# Patient Record
Sex: FEMALE | Race: WHITE | Employment: PART TIME | URBAN - METROPOLITAN AREA
[De-identification: names, ages, dates, MRNs, and addresses within clinical notes are randomized per-mention and may not be internally consistent; named-entity substitution may affect disease eponyms.]

---

## 2017-08-23 PROBLEM — E55.9 VITAMIN D DEFICIENCY: Status: ACTIVE | Noted: 2017-08-23

## 2018-04-10 ENCOUNTER — HOSPITAL ENCOUNTER (OUTPATIENT)
Dept: PHYSICAL THERAPY | Age: 48
Discharge: HOME | End: 2018-04-10
Payer: OTHER GOVERNMENT

## 2018-04-10 ENCOUNTER — HOSPITAL ENCOUNTER (OUTPATIENT)
Dept: SURGERY | Age: 48
Discharge: HOME | End: 2018-04-10
Payer: OTHER GOVERNMENT

## 2018-04-10 VITALS
WEIGHT: 153 LBS | HEART RATE: 72 BPM | HEIGHT: 66 IN | TEMPERATURE: 97.2 F | SYSTOLIC BLOOD PRESSURE: 114 MMHG | BODY MASS INDEX: 24.59 KG/M2 | RESPIRATION RATE: 18 BRPM | DIASTOLIC BLOOD PRESSURE: 67 MMHG | OXYGEN SATURATION: 98 %

## 2018-04-10 DIAGNOSIS — R06.83 SNORING: Primary | ICD-10-CM

## 2018-04-10 LAB
ANION GAP SERPL CALC-SCNC: 8 MMOL/L (ref 7–16)
APPEARANCE UR: CLEAR
APTT PPP: 27.8 SEC (ref 23.2–35.3)
ATRIAL RATE: 65 BPM
BACTERIA SPEC CULT: ABNORMAL
BASOPHILS # BLD: 0 K/UL (ref 0–0.2)
BASOPHILS NFR BLD: 1 % (ref 0–2)
BILIRUB UR QL: NEGATIVE
BUN SERPL-MCNC: 18 MG/DL (ref 6–23)
CALCIUM SERPL-MCNC: 9.2 MG/DL (ref 8.3–10.4)
CALCULATED P AXIS, ECG09: 53 DEGREES
CALCULATED R AXIS, ECG10: 12 DEGREES
CALCULATED T AXIS, ECG11: 32 DEGREES
CHLORIDE SERPL-SCNC: 103 MMOL/L (ref 98–107)
CO2 SERPL-SCNC: 29 MMOL/L (ref 21–32)
COLOR UR: YELLOW
CREAT SERPL-MCNC: 0.74 MG/DL (ref 0.6–1)
DIAGNOSIS, 93000: NORMAL
DIFFERENTIAL METHOD BLD: NORMAL
EOSINOPHIL # BLD: 0.2 K/UL (ref 0–0.8)
EOSINOPHIL NFR BLD: 3 % (ref 0.5–7.8)
ERYTHROCYTE [DISTWIDTH] IN BLOOD BY AUTOMATED COUNT: 14.2 % (ref 11.9–14.6)
GLUCOSE SERPL-MCNC: 82 MG/DL (ref 65–100)
GLUCOSE UR STRIP.AUTO-MCNC: NEGATIVE MG/DL
HCT VFR BLD AUTO: 44.8 % (ref 35.8–46.3)
HGB BLD-MCNC: 14.3 G/DL (ref 11.7–15.4)
HGB UR QL STRIP: NEGATIVE
IMM GRANULOCYTES # BLD: 0 K/UL (ref 0–0.5)
IMM GRANULOCYTES NFR BLD AUTO: 0 % (ref 0–5)
INR PPP: 1
KETONES UR QL STRIP.AUTO: NEGATIVE MG/DL
LEUKOCYTE ESTERASE UR QL STRIP.AUTO: NEGATIVE
LYMPHOCYTES # BLD: 1.2 K/UL (ref 0.5–4.6)
LYMPHOCYTES NFR BLD: 15 % (ref 13–44)
MCH RBC QN AUTO: 28.7 PG (ref 26.1–32.9)
MCHC RBC AUTO-ENTMCNC: 31.9 G/DL (ref 31.4–35)
MCV RBC AUTO: 89.8 FL (ref 79.6–97.8)
MONOCYTES # BLD: 0.8 K/UL (ref 0.1–1.3)
MONOCYTES NFR BLD: 10 % (ref 4–12)
NEUTS SEG # BLD: 5.8 K/UL (ref 1.7–8.2)
NEUTS SEG NFR BLD: 71 % (ref 43–78)
NITRITE UR QL STRIP.AUTO: NEGATIVE
P-R INTERVAL, ECG05: 158 MS
PH UR STRIP: 6 [PH] (ref 5–9)
PLATELET # BLD AUTO: 235 K/UL (ref 150–450)
PMV BLD AUTO: 12 FL (ref 10.8–14.1)
POTASSIUM SERPL-SCNC: 4.4 MMOL/L (ref 3.5–5.1)
PROT UR STRIP-MCNC: NEGATIVE MG/DL
PROTHROMBIN TIME: 13.4 SEC (ref 11.5–14.5)
Q-T INTERVAL, ECG07: 380 MS
QRS DURATION, ECG06: 84 MS
QTC CALCULATION (BEZET), ECG08: 395 MS
RBC # BLD AUTO: 4.99 M/UL (ref 4.05–5.25)
SERVICE CMNT-IMP: ABNORMAL
SODIUM SERPL-SCNC: 140 MMOL/L (ref 136–145)
SP GR UR REFRACTOMETRY: 1 (ref 1–1.02)
UROBILINOGEN UR QL STRIP.AUTO: 0.2 EU/DL (ref 0.2–1)
VENTRICULAR RATE, ECG03: 65 BPM
WBC # BLD AUTO: 8.1 K/UL (ref 4.3–11.1)

## 2018-04-10 PROCEDURE — 93005 ELECTROCARDIOGRAM TRACING: CPT | Performed by: ANESTHESIOLOGY

## 2018-04-10 PROCEDURE — 85610 PROTHROMBIN TIME: CPT | Performed by: PHYSICIAN ASSISTANT

## 2018-04-10 PROCEDURE — 87641 MR-STAPH DNA AMP PROBE: CPT | Performed by: PHYSICIAN ASSISTANT

## 2018-04-10 PROCEDURE — 81003 URINALYSIS AUTO W/O SCOPE: CPT | Performed by: PHYSICIAN ASSISTANT

## 2018-04-10 PROCEDURE — 36415 COLL VENOUS BLD VENIPUNCTURE: CPT | Performed by: PHYSICIAN ASSISTANT

## 2018-04-10 PROCEDURE — 85730 THROMBOPLASTIN TIME PARTIAL: CPT | Performed by: PHYSICIAN ASSISTANT

## 2018-04-10 PROCEDURE — 97161 PT EVAL LOW COMPLEX 20 MIN: CPT

## 2018-04-10 PROCEDURE — 80048 BASIC METABOLIC PNL TOTAL CA: CPT | Performed by: PHYSICIAN ASSISTANT

## 2018-04-10 PROCEDURE — 85025 COMPLETE CBC W/AUTO DIFF WBC: CPT | Performed by: PHYSICIAN ASSISTANT

## 2018-04-10 PROCEDURE — 77030027138 HC INCENT SPIROMETER -A

## 2018-04-10 RX ORDER — ACETAMINOPHEN 325 MG/1
325 TABLET ORAL
COMMUNITY
End: 2018-12-13

## 2018-04-10 RX ORDER — IBUPROFEN 200 MG
200 TABLET ORAL
COMMUNITY
End: 2018-04-27

## 2018-04-10 RX ORDER — VALACYCLOVIR HYDROCHLORIDE 1 G/1
TABLET, FILM COATED ORAL DAILY
COMMUNITY
End: 2021-07-20
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
## 2018-04-10 NOTE — PROGRESS NOTES
04/10/18 0730   Oxygen Therapy   O2 Sat (%) 100 %   Pulse via Oximetry 73 beats per minute   O2 Device Room air   Pre-Treatment   Breath Sounds Bilateral Clear   Pre FEV1 (liters) 2.4 liters   % Predicted 82   Incentive Spirometry Treatment   Actual Volume (ml) 2000 ml   Sleep Disorder Breathing Screen:     Patient reports symptoms of:   · Snoring   · Excessive daytime sleepiness with napping  · FATIGUE  · SLEEPS ON ,BACK, SIDE AND STOMACH  · STOP-BANG __2__  · [place] Score __9__  · Height__5'5\"___ [id number]_338 lbs____  · LEONG 4   HX OF HEADACHES  PT'S FATHER HAS DIAGNOSED ROSANNA. PT HAS BEEN CONCERNED SHE HAS ROSANNA AND HAS BEEN WANTING A SLEEP STUDY FOR QUITE SOME TIME. Sleepiness Scale:     Sitting and reading 2    Watching TV 0    Sitting inactive in a public place 1    As a passenger in a car for an hour without a break 1    Lying down to rest in the afternoon when circumstances Permits 3    Sitting and talking to someone 0    Sitting quietly after lunch without alcohol 2    In a car, while stopped for a few minutes 0    Total :  9    Refer patient for sleep study based on above assessment. Initial respiratory Assessment completed with pt. Pt was interviewed and evaluated in Joint camp prior to surgery. Patient ID:  Samara Hernandez  [id number]  40 y.o.  1970  Surgeon: Dr. Abdon Brown  Date of Surgery: The linked surgery was not found. Please check manually. Procedure: Total Left Knee Arthroplasty  Primary Care Physician: Merlin Holder, [phone]  Specialists:                                  Pt instructed in the use of Incentive Spirometry. Pt instructed to bring Incentive Spirometer back on date of surgery & to start using Is upon return to pt room.     Pt taught proper cough technique    History of smoking:   NONE                                                           Quit date:            Secondhand smoke:FATHER      Past procedures with Oxygen desaturation:NONE    Past Medical History: Diagnosis Date    Eczema     Herpes simplex     Valtrex     OA (osteoarthritis) of knee     Osteopenia     from chronic steroid treatment    Ulcerative colitis, chronic (HCC)     s/p colectomy with J pouch    Vitamin D deficiency                                     ENT:SINUS                                                                                                                      Respiratory history:DENIES SOB                                                                  Respiratory meds:  NA                                       FAMILY PRESENT:                                                            NO                                        PAST SLEEP STUDY:                           NO  HX OF ROSANNA:                                          NO                                   FATHER HAS ROSANNA  ROSANNA assessment:     PT HAS BEEN WANTING A SLEEP STUDY FOR QUITE SOME TIME                                          SLEEPS ON SIDE     ,        BACK       &         STOMACH                                                 PHYSICAL EXAM   Body mass index is 24.69 kg/(m^2).    Visit Vitals    /67 (BP 1 Location: Left arm, BP Patient Position: At rest;Sitting)    Pulse 72    Temp 97.2 °F (36.2 °C)    Resp 18    Ht 5' 6\" (1.676 m)    Wt 69.4 kg (153 lb)    SpO2 98%    BMI 24.69 kg/m2     Neck circumference:  35    cm    Loud snoring:        YES                                Witnessed apnea or wakening gasping or choking:,             DENIES,                                                                                                 Awakens with headaches:                                                  DENIES    Morning or daytime tiredness/ sleepiness:                                                                                                         TIRED   Dry mouth or sore throat in morning:                YES Sheets stage:  4    SACS score:6    STOP/BAN                              CPAP:                       NONE                                                  CONT SAT HS              Referrals:  HST  Pt.  Phone Number:    [phone]

## 2018-04-10 NOTE — PERIOP NOTES
Labs dated 4/10/18 routed via [address] [facility] to patients PCP, Dr. Jason Rojo, per Dr. Ronny uDron' request.

## 2018-04-10 NOTE — PERIOP NOTES
Recent Results (from the past 12 hour(s))   CBC WITH AUTOMATED DIFF    Collection Time: 04/10/18  7:40 AM   Result Value Ref Range    WBC 8.1 4.3 - 11.1 K/uL    RBC 4.99 4.05 - 5.25 M/uL    HGB 14.3 11.7 - 15.4 g/dL    HCT 44.8 35.8 - 46.3 %    MCV 89.8 79.6 - 97.8 FL    MCH 28.7 26.1 - 32.9 PG    MCHC 31.9 31.4 - 35.0 g/dL    RDW 14.2 11.9 - 14.6 %    PLATELET 901 437 - 765 K/uL    MPV 12.0 10.8 - 14.1 FL    DF AUTOMATED      NEUTROPHILS 71 43 - 78 %    LYMPHOCYTES 15 13 - 44 %    MONOCYTES 10 4.0 - 12.0 %    EOSINOPHILS 3 0.5 - 7.8 %    BASOPHILS 1 0.0 - 2.0 %    IMMATURE GRANULOCYTES 0 0.0 - 5.0 %    ABS. NEUTROPHILS 5.8 1.7 - 8.2 K/UL    ABS. LYMPHOCYTES 1.2 0.5 - 4.6 K/UL    ABS. MONOCYTES 0.8 0.1 - 1.3 K/UL    ABS. EOSINOPHILS 0.2 0.0 - 0.8 K/UL    ABS. BASOPHILS 0.0 0.0 - 0.2 K/UL    ABS. IMM.  GRANS. 0.0 0.0 - 0.5 K/UL   PROTHROMBIN TIME + INR    Collection Time: 04/10/18  7:40 AM   Result Value Ref Range    Prothrombin time 13.4 11.5 - 14.5 sec    INR 1.0     PTT    Collection Time: 04/10/18  7:40 AM   Result Value Ref Range    aPTT 27.8 23.2 - 43.6 SEC   METABOLIC PANEL, BASIC    Collection Time: 04/10/18  7:40 AM   Result Value Ref Range    Sodium 140 136 - 145 mmol/L    Potassium 4.4 3.5 - 5.1 mmol/L    Chloride 103 98 - 107 mmol/L    CO2 29 21 - 32 mmol/L    Anion gap 8 7 - 16 mmol/L    Glucose 82 65 - 100 mg/dL    BUN 18 6 - 23 MG/DL    Creatinine 0.74 0.6 - 1.0 MG/DL    GFR est AA >60 >60 ml/min/1.73m2    GFR est non-AA >60 >60 ml/min/1.73m2    Calcium 9.2 8.3 - 10.4 MG/DL   URINALYSIS W/ RFLX MICROSCOPIC    Collection Time: 04/10/18  7:40 AM   Result Value Ref Range    Color YELLOW      Appearance CLEAR      Specific gravity 1.005 1.001 - 1.023      pH (UA) 6.0 5.0 - 9.0      Protein NEGATIVE  NEG mg/dL    Glucose NEGATIVE  mg/dL    Ketone NEGATIVE  NEG mg/dL    Bilirubin NEGATIVE  NEG      Blood NEGATIVE  NEG      Urobilinogen 0.2 0.2 - 1.0 EU/dL    Nitrites NEGATIVE  NEG      Leukocyte Esterase NEGATIVE  NEG     EKG, 12 LEAD, INITIAL    Collection Time: 04/10/18  8:05 AM   Result Value Ref Range    Ventricular Rate 65 BPM    Atrial Rate 65 BPM    P-R Interval 158 ms    QRS Duration 84 ms    Q-T Interval 380 ms    QTC Calculation (Bezet) 395 ms    Calculated P Axis 53 degrees    Calculated R Axis 12 degrees    Calculated T Axis 32 degrees    Diagnosis       Normal sinus rhythm  Possible Left atrial enlargement  Septal infarct , age undetermined  Abnormal ECG  No previous ECGs available

## 2018-04-10 NOTE — PERIOP NOTES
Patient verified name, , and surgery as listed in [facility]. Type 3 surgery, PAT joint assessment complete. Labs per surgeon: cbc, bmp, ua, pt/inr, ptt, mrsa/mssa swab, T&S DOS; results within anesthesia limits. Labs per anesthesia protocol: All required lab work included in surgeon's orders. EKG: completed 4/10/18; results to be reviewed by anesthesia. No previous EKG available. Patient c/o discomfort and burning with urination that began 18. Patient seen at Urgent Care and prescribed Diflucan. Patient reports Diflucan did not resolve her discomfort. Patient suspects she may be having a herpes outbreak. Patient has an  prescription for 1 Gram of Valtrex. UA results and WBC results WNL today (4/10/18). MICKY Rae made aware of the above. Robi Mayes gave patient prescription for 1 Gram Valtrex daily x 7 days and instructed patient to make an appointment to see her gyn immediately. Patient verbalized understanding. Charge nurse to follow up. Hibiclens and instructions to return bottle on DOS given per hospital policy. Nasal Swab collected per MD order and instructions for Mupirocin nasal ointment if required. Patient provided with handouts including Guide to Surgery, Pain Management, Hand Hygiene, Blood Transfusion Education, and [facility] Brochure. Patient answered medical/surgical history questions at their best of ability. All prior to admission medications documented in [facility]. Original medication prescription bottle NOT visualized during patient appointment. Patient instructed to hold all vitamins 7 days prior to surgery and NSAIDS 5 days prior to surgery. Medications to be held: all vitamins/supplements/herbals and Ibuprofen.      Patient instructed to continue previous medications as prescribed prior to surgery and to take the following medications the day of surgery according to anesthesia guidelines with a small sip of water: Xanax if needed, Tylenol if needed, and Valtrex. Patient instructed to bring bottle of Hibiclens and incentive spirometer to the hospital on the DOS. Patient teach back successful and patient demonstrates knowledge of instruction.

## 2018-04-11 NOTE — PERIOP NOTES
Prescription for Mupirocin ointment 22g tube, apply intranasally to both nostrils BID x5 days pre-operatively or for a total of 10 doses; called to [facility] at [phone]. Message left for pt to return call to [phone] for swab results and instructions. 4/10/2018 12:28 PM - [place], Lab In [employer]   Component Results   Component Value Flag Ref Range Units Status   Special Requests: NO SPECIAL REQUESTS     Final   Culture result:  (A)    Final   MRSA target DNA detected, SA target DNA detected.       A positive test result does not necessarily indicate the presence of viable organisms.  It is however, presumptive for the presence of MRSA or SA.
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
## 2018-04-24 ENCOUNTER — ANESTHESIA EVENT (OUTPATIENT)
Dept: SURGERY | Age: 48
DRG: 470 | End: 2018-04-24
Payer: OTHER GOVERNMENT

## 2018-04-25 ENCOUNTER — HOME HEALTH ADMISSION (OUTPATIENT)
Dept: HOME HEALTH SERVICES | Facility: HOME HEALTH | Age: 48
End: 2018-04-25
Payer: OTHER GOVERNMENT

## 2018-04-25 ENCOUNTER — HOSPITAL ENCOUNTER (INPATIENT)
Age: 48
LOS: 2 days | Discharge: HOME HEALTH | DRG: 470 | End: 2018-04-27
Attending: ORTHOPAEDIC SURGERY | Admitting: ORTHOPAEDIC SURGERY
Payer: OTHER GOVERNMENT

## 2018-04-25 ENCOUNTER — ANESTHESIA (OUTPATIENT)
Dept: SURGERY | Age: 48
DRG: 470 | End: 2018-04-25
Payer: OTHER GOVERNMENT

## 2018-04-25 DIAGNOSIS — Z96.652 STATUS POST TOTAL LEFT KNEE REPLACEMENT: Primary | ICD-10-CM

## 2018-04-25 DIAGNOSIS — M17.32 POST-TRAUMATIC OSTEOARTHRITIS OF LEFT KNEE: ICD-10-CM

## 2018-04-25 LAB
ABO + RH BLD: NORMAL
BLOOD GROUP ANTIBODIES SERPL: NORMAL
GLUCOSE BLD STRIP.AUTO-MCNC: 91 MG/DL (ref 65–100)
HCG UR QL: NEGATIVE
HGB BLD-MCNC: 11.6 G/DL (ref 11.7–15.4)
SPECIMEN EXP DATE BLD: NORMAL

## 2018-04-25 PROCEDURE — 76010010054 HC POST OP PAIN BLOCK: Performed by: ORTHOPAEDIC SURGERY

## 2018-04-25 PROCEDURE — 77030037364 HC TIB INST CR  DISP STRY -C: Performed by: ORTHOPAEDIC SURGERY

## 2018-04-25 PROCEDURE — 74011250636 HC RX REV CODE- 250/636

## 2018-04-25 PROCEDURE — 77030002966 HC SUT PDS J&J -A: Performed by: ORTHOPAEDIC SURGERY

## 2018-04-25 PROCEDURE — 77030007880 HC KT SPN EPDRL BBMI -B: Performed by: ANESTHESIOLOGY

## 2018-04-25 PROCEDURE — 74011000302 HC RX REV CODE- 302: Performed by: ORTHOPAEDIC SURGERY

## 2018-04-25 PROCEDURE — 99253 IP/OBS CNSLTJ NEW/EST LOW 45: CPT | Performed by: PHYSICAL MEDICINE & REHABILITATION

## 2018-04-25 PROCEDURE — 77030011640 HC PAD GRND REM COVD -A: Performed by: ORTHOPAEDIC SURGERY

## 2018-04-25 PROCEDURE — 77030008467 HC STPLR SKN COVD -B: Performed by: ORTHOPAEDIC SURGERY

## 2018-04-25 PROCEDURE — 77030037363 HC FEM INST CR  DISP STRY -C: Performed by: ORTHOPAEDIC SURGERY

## 2018-04-25 PROCEDURE — 74011250637 HC RX REV CODE- 250/637: Performed by: INTERNAL MEDICINE

## 2018-04-25 PROCEDURE — 77030018836 HC SOL IRR NACL ICUM -A: Performed by: ORTHOPAEDIC SURGERY

## 2018-04-25 PROCEDURE — 77030011208: Performed by: ORTHOPAEDIC SURGERY

## 2018-04-25 PROCEDURE — 77030003665 HC NDL SPN BBMI -A: Performed by: ANESTHESIOLOGY

## 2018-04-25 PROCEDURE — 74011000250 HC RX REV CODE- 250: Performed by: ANESTHESIOLOGY

## 2018-04-25 PROCEDURE — 77030036688 HC BLNKT CLD THER S2SG -B

## 2018-04-25 PROCEDURE — 77030032490 HC SLV COMPR SCD KNE COVD -B

## 2018-04-25 PROCEDURE — 74011250637 HC RX REV CODE- 250/637: Performed by: ANESTHESIOLOGY

## 2018-04-25 PROCEDURE — 74011250637 HC RX REV CODE- 250/637: Performed by: PHYSICIAN ASSISTANT

## 2018-04-25 PROCEDURE — 65270000029 HC RM PRIVATE

## 2018-04-25 PROCEDURE — 77030006789 HC BLD SAW OSC STRY -C: Performed by: ORTHOPAEDIC SURGERY

## 2018-04-25 PROCEDURE — 74011250636 HC RX REV CODE- 250/636: Performed by: ANESTHESIOLOGY

## 2018-04-25 PROCEDURE — 77030002912 HC SUT ETHBND J&J -A: Performed by: ORTHOPAEDIC SURGERY

## 2018-04-25 PROCEDURE — 74011000258 HC RX REV CODE- 258: Performed by: ORTHOPAEDIC SURGERY

## 2018-04-25 PROCEDURE — 82962 GLUCOSE BLOOD TEST: CPT

## 2018-04-25 PROCEDURE — 86900 BLOOD TYPING SEROLOGIC ABO: CPT | Performed by: PHYSICIAN ASSISTANT

## 2018-04-25 PROCEDURE — 74011250636 HC RX REV CODE- 250/636: Performed by: ORTHOPAEDIC SURGERY

## 2018-04-25 PROCEDURE — 77030035236 HC SUT PDS STRATFX BARB J&J -B: Performed by: ORTHOPAEDIC SURGERY

## 2018-04-25 PROCEDURE — 77030006720 HC BLD PAT RMR ZIMM -B: Performed by: ORTHOPAEDIC SURGERY

## 2018-04-25 PROCEDURE — C1776 JOINT DEVICE (IMPLANTABLE): HCPCS | Performed by: ORTHOPAEDIC SURGERY

## 2018-04-25 PROCEDURE — 0SRD0JA REPLACEMENT OF LEFT KNEE JOINT WITH SYNTHETIC SUBSTITUTE, UNCEMENTED, OPEN APPROACH: ICD-10-PCS | Performed by: ORTHOPAEDIC SURGERY

## 2018-04-25 PROCEDURE — 77030013727 HC IRR FAN PULSVC ZIMM -B: Performed by: ORTHOPAEDIC SURGERY

## 2018-04-25 PROCEDURE — 36415 COLL VENOUS BLD VENIPUNCTURE: CPT | Performed by: PHYSICIAN ASSISTANT

## 2018-04-25 PROCEDURE — 86580 TB INTRADERMAL TEST: CPT | Performed by: ORTHOPAEDIC SURGERY

## 2018-04-25 PROCEDURE — 87075 CULTR BACTERIA EXCEPT BLOOD: CPT | Performed by: ORTHOPAEDIC SURGERY

## 2018-04-25 PROCEDURE — 97165 OT EVAL LOW COMPLEX 30 MIN: CPT

## 2018-04-25 PROCEDURE — 77030003602 HC NDL NRV BLK BBMI -B: Performed by: ANESTHESIOLOGY

## 2018-04-25 PROCEDURE — 77030012935 HC DRSG AQUACEL BMS -B: Performed by: ORTHOPAEDIC SURGERY

## 2018-04-25 PROCEDURE — 81025 URINE PREGNANCY TEST: CPT

## 2018-04-25 PROCEDURE — 94762 N-INVAS EAR/PLS OXIMTRY CONT: CPT

## 2018-04-25 PROCEDURE — 77030020268 HC MISC GENERAL SUPPLY: Performed by: ORTHOPAEDIC SURGERY

## 2018-04-25 PROCEDURE — 74011250636 HC RX REV CODE- 250/636: Performed by: PHYSICIAN ASSISTANT

## 2018-04-25 PROCEDURE — 76060000035 HC ANESTHESIA 2 TO 2.5 HR: Performed by: ORTHOPAEDIC SURGERY

## 2018-04-25 PROCEDURE — 85018 HEMOGLOBIN: CPT | Performed by: PHYSICIAN ASSISTANT

## 2018-04-25 PROCEDURE — 97161 PT EVAL LOW COMPLEX 20 MIN: CPT

## 2018-04-25 PROCEDURE — 76942 ECHO GUIDE FOR BIOPSY: CPT | Performed by: ORTHOPAEDIC SURGERY

## 2018-04-25 PROCEDURE — 87205 SMEAR GRAM STAIN: CPT | Performed by: ORTHOPAEDIC SURGERY

## 2018-04-25 PROCEDURE — 76010000171 HC OR TIME 2 TO 2.5 HR INTENSV-TIER 1: Performed by: ORTHOPAEDIC SURGERY

## 2018-04-25 PROCEDURE — 76210000016 HC OR PH I REC 1 TO 1.5 HR: Performed by: ORTHOPAEDIC SURGERY

## 2018-04-25 PROCEDURE — 77030031139 HC SUT VCRL2 J&J -A: Performed by: ORTHOPAEDIC SURGERY

## 2018-04-25 PROCEDURE — 74011000250 HC RX REV CODE- 250

## 2018-04-25 PROCEDURE — 77030034849: Performed by: ORTHOPAEDIC SURGERY

## 2018-04-25 PROCEDURE — 74011000250 HC RX REV CODE- 250: Performed by: ORTHOPAEDIC SURGERY

## 2018-04-25 PROCEDURE — 77030020782 HC GWN BAIR PAWS FLX 3M -B: Performed by: ANESTHESIOLOGY

## 2018-04-25 DEVICE — TIBIAL COMPONENT
Type: IMPLANTABLE DEVICE | Site: KNEE | Status: FUNCTIONAL
Brand: TRIATHLON

## 2018-04-25 DEVICE — TIBIAL BEARING INSERT
Type: IMPLANTABLE DEVICE | Site: KNEE | Status: FUNCTIONAL
Brand: TRIATHLON

## 2018-04-25 DEVICE — COMPONENT PAT DIA31MM THK9MM KNEE TRITANIUM MTL BK: Type: IMPLANTABLE DEVICE | Site: KNEE | Status: FUNCTIONAL

## 2018-04-25 DEVICE — POSTERIOR STABILIZED FEMORAL
Type: IMPLANTABLE DEVICE | Site: KNEE | Status: FUNCTIONAL
Brand: TRIATHLON

## 2018-04-25 RX ORDER — CELECOXIB 200 MG/1
200 CAPSULE ORAL ONCE
Status: COMPLETED | OUTPATIENT
Start: 2018-04-25 | End: 2018-04-25

## 2018-04-25 RX ORDER — SODIUM CHLORIDE 0.9 % (FLUSH) 0.9 %
5-10 SYRINGE (ML) INJECTION EVERY 8 HOURS
Status: DISCONTINUED | OUTPATIENT
Start: 2018-04-25 | End: 2018-04-27 | Stop reason: HOSPADM

## 2018-04-25 RX ORDER — CEFAZOLIN SODIUM/WATER 2 G/20 ML
2 SYRINGE (ML) INTRAVENOUS EVERY 8 HOURS
Status: COMPLETED | OUTPATIENT
Start: 2018-04-25 | End: 2018-04-26

## 2018-04-25 RX ORDER — LIDOCAINE HYDROCHLORIDE 10 MG/ML
0.1 INJECTION INFILTRATION; PERINEURAL AS NEEDED
Status: DISCONTINUED | OUTPATIENT
Start: 2018-04-25 | End: 2018-04-25 | Stop reason: HOSPADM

## 2018-04-25 RX ORDER — ACETAMINOPHEN 10 MG/ML
1000 INJECTION, SOLUTION INTRAVENOUS ONCE
Status: COMPLETED | OUTPATIENT
Start: 2018-04-25 | End: 2018-04-25

## 2018-04-25 RX ORDER — TRANEXAMIC ACID 100 MG/ML
INJECTION, SOLUTION INTRAVENOUS AS NEEDED
Status: DISCONTINUED | OUTPATIENT
Start: 2018-04-25 | End: 2018-04-25 | Stop reason: HOSPADM

## 2018-04-25 RX ORDER — ASPIRIN 81 MG/1
81 TABLET ORAL EVERY 12 HOURS
Qty: 70 TAB | Refills: 0 | Status: SHIPPED | OUTPATIENT
Start: 2018-04-25 | End: 2018-05-30

## 2018-04-25 RX ORDER — HYDROMORPHONE HYDROCHLORIDE 2 MG/1
2 TABLET ORAL
Status: DISCONTINUED | OUTPATIENT
Start: 2018-04-25 | End: 2018-04-27 | Stop reason: HOSPADM

## 2018-04-25 RX ORDER — DIPHENHYDRAMINE HCL 25 MG
25 CAPSULE ORAL
Status: DISCONTINUED | OUTPATIENT
Start: 2018-04-25 | End: 2018-04-27 | Stop reason: HOSPADM

## 2018-04-25 RX ORDER — CEFAZOLIN SODIUM/WATER 2 G/20 ML
2 SYRINGE (ML) INTRAVENOUS ONCE
Status: COMPLETED | OUTPATIENT
Start: 2018-04-25 | End: 2018-04-25

## 2018-04-25 RX ORDER — TRISODIUM CITRATE DIHYDRATE AND CITRIC ACID MONOHYDRATE 500; 334 MG/5ML; MG/5ML
30 SOLUTION ORAL
Status: COMPLETED | OUTPATIENT
Start: 2018-04-25 | End: 2018-04-25

## 2018-04-25 RX ORDER — HYDROMORPHONE HYDROCHLORIDE 2 MG/1
2 TABLET ORAL
Qty: 40 TAB | Refills: 0 | Status: SHIPPED | OUTPATIENT
Start: 2018-04-25 | End: 2018-08-07

## 2018-04-25 RX ORDER — NALOXONE HYDROCHLORIDE 0.4 MG/ML
.2-.4 INJECTION, SOLUTION INTRAMUSCULAR; INTRAVENOUS; SUBCUTANEOUS
Status: DISCONTINUED | OUTPATIENT
Start: 2018-04-25 | End: 2018-04-27 | Stop reason: HOSPADM

## 2018-04-25 RX ORDER — FLUCONAZOLE 100 MG/1
150 TABLET ORAL DAILY
Status: DISCONTINUED | OUTPATIENT
Start: 2018-04-26 | End: 2018-04-25

## 2018-04-25 RX ORDER — KETOROLAC TROMETHAMINE 30 MG/ML
INJECTION, SOLUTION INTRAMUSCULAR; INTRAVENOUS AS NEEDED
Status: DISCONTINUED | OUTPATIENT
Start: 2018-04-25 | End: 2018-04-25 | Stop reason: HOSPADM

## 2018-04-25 RX ORDER — ALPRAZOLAM 0.5 MG/1
0.25 TABLET ORAL
Status: DISCONTINUED | OUTPATIENT
Start: 2018-04-25 | End: 2018-04-27 | Stop reason: HOSPADM

## 2018-04-25 RX ORDER — SODIUM CHLORIDE 9 MG/ML
100 INJECTION, SOLUTION INTRAVENOUS CONTINUOUS
Status: DISPENSED | OUTPATIENT
Start: 2018-04-25 | End: 2018-04-27

## 2018-04-25 RX ORDER — FLUCONAZOLE 100 MG/1
200 TABLET ORAL
Status: COMPLETED | OUTPATIENT
Start: 2018-04-26 | End: 2018-04-26

## 2018-04-25 RX ORDER — ONDANSETRON 2 MG/ML
INJECTION INTRAMUSCULAR; INTRAVENOUS AS NEEDED
Status: DISCONTINUED | OUTPATIENT
Start: 2018-04-25 | End: 2018-04-25 | Stop reason: HOSPADM

## 2018-04-25 RX ORDER — PROPOFOL 10 MG/ML
INJECTION, EMULSION INTRAVENOUS
Status: DISCONTINUED | OUTPATIENT
Start: 2018-04-25 | End: 2018-04-25 | Stop reason: HOSPADM

## 2018-04-25 RX ORDER — SODIUM CHLORIDE 0.9 % (FLUSH) 0.9 %
5-10 SYRINGE (ML) INJECTION AS NEEDED
Status: DISCONTINUED | OUTPATIENT
Start: 2018-04-25 | End: 2018-04-27 | Stop reason: HOSPADM

## 2018-04-25 RX ORDER — ACETAMINOPHEN 500 MG
1000 TABLET ORAL ONCE
Status: COMPLETED | OUTPATIENT
Start: 2018-04-25 | End: 2018-04-25

## 2018-04-25 RX ORDER — MIDAZOLAM HYDROCHLORIDE 1 MG/ML
2 INJECTION, SOLUTION INTRAMUSCULAR; INTRAVENOUS ONCE
Status: COMPLETED | OUTPATIENT
Start: 2018-04-25 | End: 2018-04-25

## 2018-04-25 RX ORDER — HYDROMORPHONE HYDROCHLORIDE 2 MG/ML
1 INJECTION, SOLUTION INTRAMUSCULAR; INTRAVENOUS; SUBCUTANEOUS
Status: DISCONTINUED | OUTPATIENT
Start: 2018-04-25 | End: 2018-04-27 | Stop reason: HOSPADM

## 2018-04-25 RX ORDER — SODIUM CHLORIDE 0.9 % (FLUSH) 0.9 %
5-10 SYRINGE (ML) INJECTION EVERY 8 HOURS
Status: DISCONTINUED | OUTPATIENT
Start: 2018-04-25 | End: 2018-04-25 | Stop reason: HOSPADM

## 2018-04-25 RX ORDER — OXYCODONE HYDROCHLORIDE 5 MG/1
5 TABLET ORAL
Status: DISCONTINUED | OUTPATIENT
Start: 2018-04-25 | End: 2018-04-25 | Stop reason: HOSPADM

## 2018-04-25 RX ORDER — HYDROMORPHONE HYDROCHLORIDE 2 MG/ML
0.5 INJECTION, SOLUTION INTRAMUSCULAR; INTRAVENOUS; SUBCUTANEOUS
Status: DISCONTINUED | OUTPATIENT
Start: 2018-04-25 | End: 2018-04-25 | Stop reason: HOSPADM

## 2018-04-25 RX ORDER — CELECOXIB 200 MG/1
200 CAPSULE ORAL EVERY 12 HOURS
Status: DISCONTINUED | OUTPATIENT
Start: 2018-04-25 | End: 2018-04-27 | Stop reason: HOSPADM

## 2018-04-25 RX ORDER — ROPIVACAINE HYDROCHLORIDE 5 MG/ML
INJECTION, SOLUTION EPIDURAL; INFILTRATION; PERINEURAL AS NEEDED
Status: DISCONTINUED | OUTPATIENT
Start: 2018-04-25 | End: 2018-04-25 | Stop reason: HOSPADM

## 2018-04-25 RX ORDER — SODIUM CHLORIDE 0.9 % (FLUSH) 0.9 %
5-10 SYRINGE (ML) INJECTION AS NEEDED
Status: DISCONTINUED | OUTPATIENT
Start: 2018-04-25 | End: 2018-04-25 | Stop reason: HOSPADM

## 2018-04-25 RX ORDER — HYDROMORPHONE HYDROCHLORIDE 2 MG/1
2 TABLET ORAL
Status: DISCONTINUED | OUTPATIENT
Start: 2018-04-25 | End: 2018-04-25

## 2018-04-25 RX ORDER — SODIUM CHLORIDE, SODIUM LACTATE, POTASSIUM CHLORIDE, CALCIUM CHLORIDE 600; 310; 30; 20 MG/100ML; MG/100ML; MG/100ML; MG/100ML
100 INJECTION, SOLUTION INTRAVENOUS CONTINUOUS
Status: DISCONTINUED | OUTPATIENT
Start: 2018-04-25 | End: 2018-04-25 | Stop reason: HOSPADM

## 2018-04-25 RX ORDER — FENTANYL CITRATE 50 UG/ML
100 INJECTION, SOLUTION INTRAMUSCULAR; INTRAVENOUS ONCE
Status: COMPLETED | OUTPATIENT
Start: 2018-04-25 | End: 2018-04-25

## 2018-04-25 RX ORDER — SODIUM CHLORIDE, SODIUM LACTATE, POTASSIUM CHLORIDE, CALCIUM CHLORIDE 600; 310; 30; 20 MG/100ML; MG/100ML; MG/100ML; MG/100ML
75 INJECTION, SOLUTION INTRAVENOUS CONTINUOUS
Status: DISCONTINUED | OUTPATIENT
Start: 2018-04-25 | End: 2018-04-25 | Stop reason: HOSPADM

## 2018-04-25 RX ORDER — DEXAMETHASONE SODIUM PHOSPHATE 4 MG/ML
INJECTION, SOLUTION INTRA-ARTICULAR; INTRALESIONAL; INTRAMUSCULAR; INTRAVENOUS; SOFT TISSUE AS NEEDED
Status: DISCONTINUED | OUTPATIENT
Start: 2018-04-25 | End: 2018-04-25 | Stop reason: HOSPADM

## 2018-04-25 RX ORDER — NALOXONE HYDROCHLORIDE 0.4 MG/ML
0.2 INJECTION, SOLUTION INTRAMUSCULAR; INTRAVENOUS; SUBCUTANEOUS AS NEEDED
Status: DISCONTINUED | OUTPATIENT
Start: 2018-04-25 | End: 2018-04-25 | Stop reason: HOSPADM

## 2018-04-25 RX ORDER — FENTANYL CITRATE 50 UG/ML
INJECTION, SOLUTION INTRAMUSCULAR; INTRAVENOUS AS NEEDED
Status: DISCONTINUED | OUTPATIENT
Start: 2018-04-25 | End: 2018-04-25 | Stop reason: HOSPADM

## 2018-04-25 RX ORDER — MIDAZOLAM HYDROCHLORIDE 1 MG/ML
INJECTION, SOLUTION INTRAMUSCULAR; INTRAVENOUS AS NEEDED
Status: DISCONTINUED | OUTPATIENT
Start: 2018-04-25 | End: 2018-04-25 | Stop reason: HOSPADM

## 2018-04-25 RX ORDER — ASPIRIN 81 MG/1
81 TABLET ORAL EVERY 12 HOURS
Status: DISCONTINUED | OUTPATIENT
Start: 2018-04-25 | End: 2018-04-27 | Stop reason: HOSPADM

## 2018-04-25 RX ORDER — FENTANYL CITRATE 50 UG/ML
100 INJECTION, SOLUTION INTRAMUSCULAR; INTRAVENOUS ONCE
Status: DISCONTINUED | OUTPATIENT
Start: 2018-04-25 | End: 2018-04-25 | Stop reason: HOSPADM

## 2018-04-25 RX ORDER — AMOXICILLIN 250 MG
2 CAPSULE ORAL DAILY
Status: DISCONTINUED | OUTPATIENT
Start: 2018-04-26 | End: 2018-04-25

## 2018-04-25 RX ORDER — VALACYCLOVIR HYDROCHLORIDE 500 MG/1
1000 TABLET, FILM COATED ORAL DAILY
Status: DISCONTINUED | OUTPATIENT
Start: 2018-04-26 | End: 2018-04-25

## 2018-04-25 RX ORDER — LOPERAMIDE HYDROCHLORIDE 2 MG/1
2 CAPSULE ORAL
Status: DISCONTINUED | OUTPATIENT
Start: 2018-04-25 | End: 2018-04-27 | Stop reason: HOSPADM

## 2018-04-25 RX ORDER — PROPOFOL 10 MG/ML
INJECTION, EMULSION INTRAVENOUS AS NEEDED
Status: DISCONTINUED | OUTPATIENT
Start: 2018-04-25 | End: 2018-04-25 | Stop reason: HOSPADM

## 2018-04-25 RX ORDER — ACETAMINOPHEN 500 MG
1000 TABLET ORAL EVERY 6 HOURS
Status: DISCONTINUED | OUTPATIENT
Start: 2018-04-26 | End: 2018-04-27 | Stop reason: HOSPADM

## 2018-04-25 RX ORDER — DEXAMETHASONE SODIUM PHOSPHATE 100 MG/10ML
10 INJECTION INTRAMUSCULAR; INTRAVENOUS ONCE
Status: ACTIVE | OUTPATIENT
Start: 2018-04-26 | End: 2018-04-27

## 2018-04-25 RX ORDER — AMOXICILLIN 250 MG
2 CAPSULE ORAL
Status: DISCONTINUED | OUTPATIENT
Start: 2018-04-25 | End: 2018-04-27 | Stop reason: HOSPADM

## 2018-04-25 RX ORDER — ONDANSETRON 2 MG/ML
4 INJECTION INTRAMUSCULAR; INTRAVENOUS
Status: DISCONTINUED | OUTPATIENT
Start: 2018-04-25 | End: 2018-04-27 | Stop reason: HOSPADM

## 2018-04-25 RX ADMIN — PROPOFOL 30 MG: 10 INJECTION, EMULSION INTRAVENOUS at 11:22

## 2018-04-25 RX ADMIN — ACETAMINOPHEN 1000 MG: 500 TABLET, FILM COATED ORAL at 08:03

## 2018-04-25 RX ADMIN — VANCOMYCIN HYDROCHLORIDE 1 G: 1 INJECTION, POWDER, LYOPHILIZED, FOR SOLUTION INTRAVENOUS at 10:15

## 2018-04-25 RX ADMIN — FENTANYL CITRATE 25 MCG: 50 INJECTION, SOLUTION INTRAMUSCULAR; INTRAVENOUS at 11:01

## 2018-04-25 RX ADMIN — MIDAZOLAM HYDROCHLORIDE 1 MG: 1 INJECTION, SOLUTION INTRAMUSCULAR; INTRAVENOUS at 10:04

## 2018-04-25 RX ADMIN — ALPRAZOLAM 0.25 MG: 0.5 TABLET ORAL at 23:28

## 2018-04-25 RX ADMIN — FENTANYL CITRATE 12.5 MCG: 50 INJECTION, SOLUTION INTRAMUSCULAR; INTRAVENOUS at 10:44

## 2018-04-25 RX ADMIN — PROPOFOL 100 MCG/KG/MIN: 10 INJECTION, EMULSION INTRAVENOUS at 10:04

## 2018-04-25 RX ADMIN — FENTANYL CITRATE 25 MCG: 50 INJECTION, SOLUTION INTRAMUSCULAR; INTRAVENOUS at 10:34

## 2018-04-25 RX ADMIN — ACETAMINOPHEN 1000 MG: 10 INJECTION, SOLUTION INTRAVENOUS at 17:35

## 2018-04-25 RX ADMIN — SODIUM CHLORIDE, SODIUM LACTATE, POTASSIUM CHLORIDE, AND CALCIUM CHLORIDE 100 ML/HR: 600; 310; 30; 20 INJECTION, SOLUTION INTRAVENOUS at 08:02

## 2018-04-25 RX ADMIN — LOPERAMIDE HYDROCHLORIDE 2 MG: 2 CAPSULE ORAL at 14:55

## 2018-04-25 RX ADMIN — FENTANYL CITRATE 12.5 MCG: 50 INJECTION, SOLUTION INTRAMUSCULAR; INTRAVENOUS at 10:57

## 2018-04-25 RX ADMIN — Medication 2 G: at 17:35

## 2018-04-25 RX ADMIN — MIDAZOLAM HYDROCHLORIDE 2 MG: 1 INJECTION, SOLUTION INTRAMUSCULAR; INTRAVENOUS at 09:47

## 2018-04-25 RX ADMIN — ASPIRIN 81 MG: 81 TABLET, COATED ORAL at 20:25

## 2018-04-25 RX ADMIN — HYDROMORPHONE HYDROCHLORIDE 1 MG: 2 INJECTION, SOLUTION INTRAMUSCULAR; INTRAVENOUS; SUBCUTANEOUS at 16:11

## 2018-04-25 RX ADMIN — FENTANYL CITRATE 12.5 MCG: 50 INJECTION, SOLUTION INTRAMUSCULAR; INTRAVENOUS at 10:58

## 2018-04-25 RX ADMIN — CELECOXIB 200 MG: 200 CAPSULE ORAL at 08:03

## 2018-04-25 RX ADMIN — Medication 2 G: at 09:50

## 2018-04-25 RX ADMIN — DEXAMETHASONE SODIUM PHOSPHATE 10 MG: 4 INJECTION, SOLUTION INTRA-ARTICULAR; INTRALESIONAL; INTRAMUSCULAR; INTRAVENOUS; SOFT TISSUE at 10:10

## 2018-04-25 RX ADMIN — TUBERCULIN PURIFIED PROTEIN DERIVATIVE 5 UNITS: 5 INJECTION, SOLUTION INTRADERMAL at 08:05

## 2018-04-25 RX ADMIN — SODIUM CHLORIDE, SODIUM LACTATE, POTASSIUM CHLORIDE, AND CALCIUM CHLORIDE: 600; 310; 30; 20 INJECTION, SOLUTION INTRAVENOUS at 09:36

## 2018-04-25 RX ADMIN — SODIUM CITRATE AND CITRIC ACID MONOHYDRATE 30 ML: 500; 334 SOLUTION ORAL at 12:22

## 2018-04-25 RX ADMIN — MIDAZOLAM HYDROCHLORIDE 1 MG: 1 INJECTION, SOLUTION INTRAMUSCULAR; INTRAVENOUS at 10:09

## 2018-04-25 RX ADMIN — ACETAMINOPHEN 1000 MG: 500 TABLET, FILM COATED ORAL at 23:26

## 2018-04-25 RX ADMIN — SODIUM CHLORIDE, SODIUM LACTATE, POTASSIUM CHLORIDE, AND CALCIUM CHLORIDE: 600; 310; 30; 20 INJECTION, SOLUTION INTRAVENOUS at 10:52

## 2018-04-25 RX ADMIN — CELECOXIB 200 MG: 200 CAPSULE ORAL at 20:25

## 2018-04-25 RX ADMIN — SODIUM CHLORIDE 100 ML/HR: 900 INJECTION, SOLUTION INTRAVENOUS at 14:00

## 2018-04-25 RX ADMIN — ROPIVACAINE HYDROCHLORIDE 20 ML: 5 INJECTION, SOLUTION EPIDURAL; INFILTRATION; PERINEURAL at 09:07

## 2018-04-25 RX ADMIN — VANCOMYCIN HYDROCHLORIDE 1000 MG: 1 INJECTION, POWDER, LYOPHILIZED, FOR SOLUTION INTRAVENOUS at 08:36

## 2018-04-25 RX ADMIN — HYDROMORPHONE HYDROCHLORIDE 1 MG: 2 INJECTION, SOLUTION INTRAMUSCULAR; INTRAVENOUS; SUBCUTANEOUS at 23:27

## 2018-04-25 RX ADMIN — PROPOFOL 20 MG: 10 INJECTION, EMULSION INTRAVENOUS at 11:26

## 2018-04-25 RX ADMIN — PROPOFOL 20 MG: 10 INJECTION, EMULSION INTRAVENOUS at 11:16

## 2018-04-25 RX ADMIN — Medication 5 ML: at 14:00

## 2018-04-25 RX ADMIN — FENTANYL CITRATE 12.5 MCG: 50 INJECTION, SOLUTION INTRAMUSCULAR; INTRAVENOUS at 10:52

## 2018-04-25 RX ADMIN — TRANEXAMIC ACID 1 G: 100 INJECTION, SOLUTION INTRAVENOUS at 09:56

## 2018-04-25 RX ADMIN — ONDANSETRON 4 MG: 2 INJECTION INTRAMUSCULAR; INTRAVENOUS at 10:10

## 2018-04-25 RX ADMIN — PROPOFOL 20 MG: 10 INJECTION, EMULSION INTRAVENOUS at 11:18

## 2018-04-25 RX ADMIN — HYDROMORPHONE HYDROCHLORIDE 2 MG: 2 TABLET ORAL at 15:17

## 2018-04-25 RX ADMIN — PROPOFOL 30 MG: 10 INJECTION, EMULSION INTRAVENOUS at 10:53

## 2018-04-25 RX ADMIN — LIDOCAINE HYDROCHLORIDE 0.1 ML: 10 INJECTION, SOLUTION INFILTRATION; PERINEURAL at 08:05

## 2018-04-25 RX ADMIN — MIDAZOLAM HYDROCHLORIDE 2 MG: 1 INJECTION, SOLUTION INTRAMUSCULAR; INTRAVENOUS at 09:05

## 2018-04-25 RX ADMIN — FENTANYL CITRATE 100 MCG: 50 INJECTION, SOLUTION INTRAMUSCULAR; INTRAVENOUS at 09:05

## 2018-04-25 RX ADMIN — HYDROMORPHONE HYDROCHLORIDE 1 MG: 2 INJECTION, SOLUTION INTRAMUSCULAR; INTRAVENOUS; SUBCUTANEOUS at 20:25

## 2018-04-25 NOTE — PERIOP NOTES
Betadine lavage:  17.5cc of betadine lot # [id number] , exp. Date : 09/19 ,  in 500cc of . 9NS Lot #[id number]  , exp. Date : 1feb2021.

## 2018-04-25 NOTE — OP NOTES
[address] [facility]  Total Knee Arthroplasty  Ronnie Lynch   : 1970  Medical Record Number:[id number]  Pre-operative Diagnosis:  Primary osteoarthritis of left knee [M17.12]  Post-operative Diagnosis: Status post total left knee replacement  Location: [address] [place] [address]  Surgeon: Jaleel Quigley MD  Assistant: Kemar Amaya PA-C    Anesthesia: Spinal and FNB    Procedure: Procedure(s) (LRB):  KNEE ARTHROPLASTY TOTAL/ LEFT/ DYANA/ FNB /ANCEF 2gm / Jordonst Barker ([place] - 2018) (Left)    The complexity of the total joint surgery requires the use of a first assistant for positioning, retraction and expertise in closure. Tourniquet Time: 0 minutes  EBL: 250cc  Findings: severe degenerative arthritis, patellar osteophytes, posterior femoral osteophytes   BMI: Body mass index is 24.69 kg/(m^2). Elian Ashford was brought to the operating room and positioned on the operating table. She was anethestized with anesthesia. A pryor catheter was placed preoperatively and IV antibiotics was administered. Prior to the incision being made a timeout was called identifying the patient, procedure ,operative side and surgeon. .The operative leg was prepped and draped in the usual sterile manner. An anterior longitudinal incision was accomplished just medial to the tibial tubercle and extending approximal 6 centimeters proximal to the superior pole of the patella. A medial parapatellar capsular incision was performed. The medial capsular flap was elevated around to the insertion of the semimembranous tendon. The patella was everted and the knee flexed and externally rotated. The medial and external menisci were excised. The lateral half of the fat pad excised and the patella femoral ligament was released. The anterior cruciate ligament was resected and the posterior cruciate ligament was substituted.   Using extramedullary instrumentation, the tibial cut was accomplished with appropriate posterior slope. Approxiamately 9mm of bone was removed from the high side of the tibia. The distal femur was next addressed. A drill hole was made above the intracondolar notch. Using appropriate intramedullary instrumentation,a five degree valgus distal cut was accomplished. The femur was sized. The anterior and posterior cuts were then made about the distal femur. The osteophytes were removed from the tibial and femoral surfaces. The flexion and extension gaps were assessed with the appropriate spacer blocks. Additional surgical procedures included: none. The flexion and extension gaps were deemed appropriately balanced. The appropriate cutting blocks were then utilized to perform the anterior chamfer, posterior chamfer and notch cuts, with appropriate lateral tranlation accomplished for the patellofemoral groove. The tibia baseplate was sized. The tibial base plate was pinned into place with the appropriate external rotation and stem site prepared. A preliminary range of motion was accomplished with  trial components. The patient was able to obtain full extension as well as appropriate flexion. The patient's ligaments were stable in flexion and extension to medial and lateral stressing and the alignment was through the appropriate mechanical axis. The patella was then everted. The bone was resected to accomodate the three peg  patella button. A trial reduction revealed appropriate tracking through the patellofemoral groove with no lateral retinacular release being accomplished. All trial components were removed. The knee was irrigated. There were no femoral deficiencies. There were no tibial deficiencies. No augmentation was utilized. The permanent Tibial and Femoral components were impacted into place. The patella component was then pressed in place.     [facility] knee was placed through range of motion and noted to be stable as mentioned above with the trail components. The wound was dry, therefore no drain was used. The operative knee was injected with 60cc of Naropin, 10 cc's of morphine and 1 cc of 30mg of Toradol. The knee was then soaked with a diluted betadine solution for approximately 3 min. This was then thoroughly irrigated. The capsular layer was closed using a #1 PDS suture. The knee joint was then injected with transexamic acid. The subcutaneous layers were closed using 2-0 Stratafix suture. Finally the skin was closed using 3-0 Vicryl and skin staples, which were applied in occlusive fashion and sterile bandage applied. An Iceman cryo pad was applied on the operative leg. Sponge count and needle counts were correct. [employer] left the operating room     Implants:   Implant Name Type Inv.  Item Serial No.  Lot No. LRB No. Used   COMPNT FEM PS TRIATHLN 3 L PA --  - [id number]  COMPNT FEM PS TRIATHLN 3 L PA --  [id number] [employer] HOW [id number] Left 1   BASEPLT TIB PC TRITNM SZ 3 -- TRIATHLON - [id number]  BASEPLT TIB PC TRITNM SZ 3 -- TRIATHLON [id number] [employer] HOW [id number] Left 1   PAT SYMM MTL-BK 9MM SZ 31MM -- TRIATHLON - [id number]  PAT SYMM MTL-BK 9MM SZ 31MM -- TRIATHLON [id number] [employer] HOW [id number] Left 1   INSERT TIB PS TRIATHLN 3 11MM --  - [id number]   INSERT TIB PS TRIATHLN 3 11MM --  [id number] [employer] HOW [id number] Left 1           Signed By: Sarah Simeon MD  4/25/2018,  11:54 AM
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
## 2018-04-25 NOTE — ANESTHESIA POSTPROCEDURE EVALUATION
Post-Anesthesia Evaluation and Assessment    Patient: Shant Villegas MRN: [id number]  SSN: [id number]    YOB: 1970  Age: 50 y.o. Sex: female       Cardiovascular Function/Vital Signs  Visit Vitals    /57 (BP 1 Location: Right arm, BP Patient Position: At rest)    Pulse 84    Temp 36.6 °C (97.9 °F)    Resp 18    Ht 5' 6\" (1.676 m)    Wt 69.4 kg (153 lb)    SpO2 99%    BMI 24.69 kg/m2       Patient is status post spinal anesthesia for Procedure(s):  KNEE ARTHROPLASTY TOTAL/ LEFT/ DYANA/ FNB /ANCEF 2gm / VANCO (ME - 4/24/2018). Nausea/Vomiting: None    Postoperative hydration reviewed and adequate. Pain:  Pain Scale 1: Visual (04/25/18 1147)  Pain Intensity 1: 5 (04/25/18 1147)   Managed    Neurological Status:   Neuro (WDL): Within Defined Limits (04/25/18 1147)  Neuro  Neurologic State: Alert (04/25/18 1147)  Orientation Level: Oriented X4 (04/25/18 1147)  Cognition: Follows commands (04/25/18 1147)  Speech: Clear (04/25/18 1147)   At baseline    Mental Status and Level of Consciousness: Arousable    Pulmonary Status:   O2 Device: Nasal cannula (04/25/18 1217)   Adequate oxygenation and airway patent    Complications related to anesthesia: None    Post-anesthesia assessment completed. No concerns. Pt doing well.      Signed By: Eugenia Laguerre MD     April 25, 2018

## 2018-04-25 NOTE — ANESTHESIA PREPROCEDURE EVALUATION
Anesthetic History   No history of anesthetic complications            Review of Systems / Medical History  Patient summary reviewed and pertinent labs reviewed    Pulmonary  Within defined limits                 Neuro/Psych   Within defined limits           Cardiovascular  Within defined limits                Exercise tolerance: >4 METS  Comments: Denies CV history   GI/Hepatic/Renal               Comments: Ulcerative colitis Endo/Other  Within defined limits           Other Findings              Physical Exam    Airway  Mallampati: II  TM Distance: 4 - 6 cm  Neck ROM: normal range of motion   Mouth opening: Normal     Cardiovascular    Rhythm: regular  Rate: normal         Dental  No notable dental hx       Pulmonary  Breath sounds clear to auscultation               Abdominal  GI exam deferred       Other Findings            Anesthetic Plan    ASA: 2  Anesthesia type: spinal      Post-op pain plan if not by surgeon: peripheral nerve block single      Anesthetic plan and risks discussed with: Patient
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
## 2018-04-25 NOTE — PROGRESS NOTES
Problem: Self Care Deficits Care Plan (Adult)  Goal: *Acute Goals and Plan of Care (Insert Text)  GOALS:   DISCHARGE GOALS (in preparation for going home/rehab):  3 days  1. Ms. Diana Jernigan will perform one lower body dressing activity with minimal assistance required to demonstrate improved functional mobility and safety. 2.  Ms. Diana Jernigan will perform one lower body bathing activity with minimal assistance required to demonstrate improved functional mobility and safety. 3.  Ms. Diana Jernigan will perform toileting/toilet transfer with contact guard assistance to demonstrate improved functional mobility and safety. 4.  Ms. Diana Jernigan will perform shower transfer with contact guard assistance to demonstrate improved functional mobility and safety. JOINT CAMP OCCUPATIONAL THERAPY TKA: Initial Assessment and PM 4/25/2018  INPATIENT: Hospital Day: 1  Payor: SARAY / Plan: Watson Dobbins 74 / Product Type:  /      NAME/AGE/GENDER: Radha Norris is a 50 y.o. female   PRIMARY DIAGNOSIS:  Primary osteoarthritis of left knee [M17.12]   Procedure(s) and Anesthesia Type:     * KNEE ARTHROPLASTY TOTAL/ LEFT/ DYANA/ FNB /ANCEF 2gm / VANCO ([place] - 4/24/2018) - Spinal (Left)  ICD-10: Treatment Diagnosis:    · Pain in Left Knee (M25.562)  · Stiffness of Left Knee, Not elsewhere classified (W68.024)  · Generalized Muscle Weakness (M62.81)  · Other lack of cordination (R27.8)      ASSESSMENT:     Ms. Diana Jernigan is s/p left TKA and presents with decreased weight bearing on left LE and decreased independence with functional mobility and activities of daily living as compared to baseline level of function and safety. Patient would benefit from skilled Occupational Therapy to maximize independence and safety with self-care task and functional mobility.   Pt would also benefit from education on adaptive equipment and safety precautions in preparation for going home or for recommendations for post-hospital rehab program.  Patient plans for further rehab at home with home health services and good family support . OT reviewed therapy schedule and plan of care with patient. Patient was able to transfer and preform self care skills as charted below. Patient instructed to call for assistance when needing to get up from the bed and all needs in reach. Patient verbalized understanding of call light. This section established at most recent assessment   PROBLEM LIST (Impairments causing functional limitations):  1. Decreased Strength  2. Decreased ADL/Functional Activities  3. Decreased Transfer Abilities  4. Increased Pain  5. Increased Fatigue  6. Decreased Flexibility/Joint Mobility  7. Decreased Knowledge of Precautions   INTERVENTIONS PLANNED: (Benefits and precautions of occupational therapy have been discussed with the patient.)  1. Activities of daily living training  2. Adaptive equipment training  3. Balance training  4. Clothing management  5. Donning&doffing training  6. Theraputic activity     TREATMENT PLAN: Frequency/Duration: Follow patient 1 time to address above goals. Rehabilitation Potential For Stated Goals: Excellent     RECOMMENDED REHABILITATION/EQUIPMENT: (at time of discharge pending progress): Continue Skilled Therapy and Home Health: Physical Therapy. OCCUPATIONAL PROFILE AND HISTORY:   History of Present Injury/Illness (Reason for Referral): Pt presents this date s/p (left) TKA. Past Medical History/Comorbidities:   Ms. Diana Jernigan  has a past medical history of Eczema; Herpes simplex; OA (osteoarthritis) of knee; Osteopenia; Status post total left knee replacement (4/25/2018); Ulcerative colitis, chronic ([facility] [address] and Vitamin D deficiency. Ms. Diana Jernigan  has a past surgical history that includes hx orthopaedic (as a teenager); pr abdomen surgery proc unlisted; and hx refractive surgery.   Social History/Living Environment:   Home Environment: Private residence  # Steps to Enter: 4  Rails to Enter: Yes  Hand Rails : Right  One/Two Story Residence: One story  Living Alone: No  Support Systems: Spouse/Significant Other/Partner  Patient Expects to be Discharged to[de-identified] Private residence  Current DME Used/Available at Home: None  Tub or Shower Type: Tub/Shower combination  Prior Level of Function/Work/Activity:  Indep with self care and functional mobility     Number of Personal Factors/Comorbidities that affect the Plan of Care: Brief history (0):  LOW COMPLEXITY   ASSESSMENT OF OCCUPATIONAL PERFORMANCE[de-identified]   Most Recent Physical Functioning:                        Coordination  Fine Motor Skills-Upper: Left Intact; Right Intact  Gross Motor Skills-Upper: Left Intact; Right Intact         Mental Status  Neurologic State: Alert  Orientation Level: Oriented X4  Cognition: Appropriate decision making; Appropriate for age attention/concentration; Appropriate safety awareness; Follows commands  Perception: Appears intact  Perseveration: No perseveration noted  Safety/Judgement: Awareness of environment; Fall prevention                Basic ADLs (From Assessment) Complex ADLs (From Assessment)   Basic ADL  Feeding: Supervision  Oral Facial Hygiene/Grooming: Supervision  Bathing: Moderate assistance  Upper Body Dressing: Minimum assistance  Lower Body Dressing: Moderate assistance  Toileting: Total assistance     Grooming/Bathing/Dressing Activities of Daily Living     Cognitive Retraining  Safety/Judgement: Awareness of environment; Fall prevention                 Functional Transfers  Toilet Transfer : Minimum assistance  Shower Transfer: Minimum assistance     Bed/Mat Mobility  Supine to Sit: Minimum assistance  Sit to Supine: Minimum assistance  Sit to Stand: Minimum assistance         Physical Skills Involved:  1. Range of Motion  2. Balance  3. Strength Cognitive Skills Affected (resulting in the inability to perform in a timely and safe manner):  1. NOne Psychosocial Skills Affected:  1.  None   Number of elements that affect the Plan of Care: 1-3:  LOW COMPLEXITY   CLINICAL DECISION MAKIN[address] [place] [address] AM-PAC 6 Clicks   Daily Activity Inpatient Short Form  How much help from another person does the patient currently need. .. Total A Lot A Little None   1. Putting on and taking off regular lower body clothing? [] 1   [x] 2   [] 3   [] 4   2. Bathing (including washing, rinsing, drying)? [] 1   [x] 2   [] 3   [] 4   3. Toileting, which includes using toilet, bedpan or urinal?   [] 1   [x] 2   [] 3   [] 4   4. Putting on and taking off regular upper body clothing? [] 1   [] 2   [x] 3   [] 4   5. Taking care of personal grooming such as brushing teeth? [] 1   [] 2   [x] 3   [] 4   6. Eating meals? [] 1   [] 2   [x] 3   [] 4   © , Trustees of [address] [place] [address], under license to [email or web address]. All rights reserved     Score:  Initial: 15 Most Recent: X (Date: -- )    Interpretation of Tool:  Represents activities that are increasingly more difficult (i.e. Bed mobility, Transfers, Gait). Score 24 23 22-20 19-15 14-10 9-7 6     Modifier CH CI CJ CK CL CM CN      ? Self Care:     - CURRENT STATUS: CK - 40%-59% impaired, limited or restricted    - GOAL STATUS: CJ - 20%-39% impaired, limited or restricted    - D/C STATUS:  ---------------To be determined---------------  Payor:  / Plan: Watson Dobbins 74 / Product Type:  /      Medical Necessity:     · Patient is expected to demonstrate progress in balance, coordination and functional technique to decrease assistance required with self care and functional mobility. Reason for Services/Other Comments:  · Patient continues to require skilled intervention due to decreased self care and functional mobility.    Use of outcome tool(s) and clinical judgement create a POC that gives a: LOW COMPLEXITY            TREATMENT:   (In addition to Assessment/Re-Assessment sessions the following treatments were rendered)     Pre-treatment Symptoms/Complaints:  None  Pain: Initial:   Pain Intensity 1: 0  Post Session:  0     Assessment/Reassessment only, no treatment provided today    Treatment/Session Assessment:     Response to Treatment:  Tolerated well. Education:  [] Home Exercises  [x] Fall Precautions  [] Hip Precautions [] Going Home Video  [x] Knee/Hip Prosthesis Review  [x] Walker Management/Safety [x] Adaptive Equipment as Needed       Interdisciplinary Collaboration:   o Physical Therapist  o Occupational Therapist  o Registered Nurse    After treatment position/precautions:   o Supine in bed  o Bed/Chair-wheels locked  o Bed in low position  o Caregiver at bedside  o Call light within reach  o RN notified  o Family at bedside  o Side rails x 3     Compliance with Program/Exercises: compliant all of the time. Recommendations/Intent for next treatment session:  Treatment next visit will focus on increasing Ms. Lagunas's independence with bed mobility, transfers, self care, functional mobility, modalities for pain, and patient education.       Total Treatment Duration:  OT Patient Time In/Time Out  Time In: 1620  Time Out: [address], [place]

## 2018-04-25 NOTE — PROGRESS NOTES
TRANSFER - IN REPORT:    Verbal report received from Kristie Freeman RN on [place]  being received from PACU for routine post - op      Report consisted of patients Situation, Background, Assessment and   Recommendations(SBAR). Information from the following report(s) SBAR, Intake/Output and MAR was reviewed with the receiving nurse. Opportunity for questions and clarification was provided. Assessment completed upon patients arrival to unit and care assumed. Oriented to room, bed controls, and how to order meals. Family in room. Aquacel dry and intact to L knee with iceman. SCDs to LEs. Yellow gripper socks to feet and instructed not to get up without staff to assist. Has IS. Instructed to call for pain medication when needed. Moving legs slightly but still with numbness. C/o gas pain and indigestion, burping.

## 2018-04-25 NOTE — PERIOP NOTES
TRANSFER - IN REPORT:    Verbal report received from [facility] RN(name) on [place]  being received from 338(unit) for routine progression of care      Report consisted of patients Situation, Background, Assessment and   Recommendations(SBAR). Information from the following report(s) SBAR, Kardex, OR Summary, Intake/Output, MAR and Cardiac Rhythm NSR was reviewed with the receiving nurse. Opportunity for questions and clarification was provided. Assessment completed upon patients arrival to unit and care assumed.

## 2018-04-25 NOTE — PROGRESS NOTES
Problem: Mobility Impaired (Adult and Pediatric)  Goal: *Acute Goals and Plan of Care (Insert Text)  GOALS (1-4 days):  (1.)Ms. Franklin Aranda will move from supine to sit and sit to supine  in bed with INDEPENDENT. (2.)Ms. Franklin Aranda will transfer from bed to chair and chair to bed with INDEPENDENT using the least restrictive device. (3.)Ms. Franklin Aranda will ambulate with INDEPENDENT for 250 feet with the least restrictive device. (4.)Ms. Franklin Aranda will ambulate up/down 3 steps with bilateral  railing with MINIMAL ASSIST with no device. (5.)Ms. Franklin Aranda will increase left knee ROM to 5°-80°.  ________________________________________________________________________________________________    Outcome: Progressing Towards Goal    PHYSICAL THERAPY Joint camp tKa: Initial Assessment 4/25/2018  INPATIENT: Hospital Day: 1  Payor: SARAY / Plan: Watson Dobbins 74 / Product Type:  /      NAME/AGE/GENDER: Sandra Asencio is a 50 y.o. female   PRIMARY DIAGNOSIS:  Primary osteoarthritis of left knee [M17.12]   Procedure(s) and Anesthesia Type:     * KNEE ARTHROPLASTY TOTAL/ LEFT/ DYANA/ FNB /ANCEF 2gm / VANCO ([place] - 4/24/2018) - Spinal (Left)  ICD-10: Treatment Diagnosis:    · Pain in Left Knee (M25.562)  · Stiffness of Left Knee, Not elsewhere classified (M25.662)  · Difficulty in walking, Not elsewhere classified (R26.2)  · Other abnormalities of gait and mobility (R26.89)      ASSESSMENT:     Ms. Franklin Aranda presents status post left total knee replacement with decreased independence with functional mobility. Pt progressing with steps to head of bed. This section established at most recent assessment   PROBLEM LIST (Impairments causing functional limitations):  1. Decreased Strength  2. Decreased Transfer Abilities  3. Decreased Ambulation Ability/Technique  4. Decreased Balance  5. Increased Pain  6. Decreased Activity Tolerance  7.  Decreased Flexibility/Joint Mobility   INTERVENTIONS PLANNED: (Benefits and precautions of physical therapy have been discussed with the patient.)  1. Bed Mobility  2. Gait Training  3. Home Exercise Program (HEP)  4. Therapeutic Exercise/Strengthening  5. Transfer Training  6. Range of Motion: active/assisted/passive  7. Therapeutic Activities  8. Group Therapy     TREATMENT PLAN: Frequency/Duration: Follow patient BID for duration of hospital stay to address above goals. Rehabilitation Potential For Stated Goals: Good     RECOMMENDED REHABILITATION/EQUIPMENT: (at time of discharge pending progress): Home Health: Physical Therapy. HISTORY:   History of Present Injury/Illness (Reason for Referral):  Pt is status post left total knee replacement. Past Medical History/Comorbidities:   Ms. Henry Cooper  has a past medical history of Eczema; Herpes simplex; OA (osteoarthritis) of knee; Osteopenia; Status post total left knee replacement (4/25/2018); Ulcerative colitis, chronic ([facility] [address] and Vitamin D deficiency. Ms. Henry Cooper  has a past surgical history that includes hx orthopaedic (as a teenager); pr abdomen surgery proc unlisted; and hx refractive surgery. Social History/Living Environment:   Home Environment: Private residence  # Steps to Enter: 4  Rails to Enter: Yes  Hand Rails : Right  One/Two Story Residence: One story  Living Alone: No  Support Systems: Spouse/Significant Other/Partner  Patient Expects to be Discharged to[de-identified] Private residence  Current DME Used/Available at Home: None  Tub or Shower Type: Tub/Shower combination  Prior Level of Function/Work/Activity:  Independent with ambulation.    Number of Personal Factors/Comorbidities that affect the Plan of Care: 0: LOW COMPLEXITY   EXAMINATION:   Most Recent Physical Functioning:      Gross Assessment  AROM: Generally decreased, functional  PROM: Generally decreased, functional  Strength: Generally decreased, functional  Coordination: Generally decreased, functional  Tone: Normal          LLE AROM  L Knee Flexion: 60  L Knee Extension: 30          Bed Mobility  Supine to Sit: Minimum assistance  Sit to Supine: Minimum assistance    Transfers  Sit to Stand: Minimum assistance  Stand to Sit: Minimum assistance    Balance  Sitting: Intact  Standing: Intact              Weight Bearing Status  Left Side Weight Bearing: As tolerated  Distance (ft): 8 Feet (ft) (to head of bed)  Ambulation - Level of Assistance: Independent  Assistive Device: Walker, rolling  Gait Abnormalities: Antalgic        Braces/Orthotics: none    Left Knee Cold  Type: Cryocuff      Body Structures Involved:  1. Bones  2. Joints  3. Muscles  4. Ligaments Body Functions Affected:  1. Neuromusculoskeletal  2. Movement Related Activities and Participation Affected:  1. Mobility   Number of elements that affect the Plan of Care: 4+: HIGH COMPLEXITY   CLINICAL PRESENTATION:   Presentation: Stable and uncomplicated: LOW COMPLEXITY   CLINICAL DECISION MAKIN[address] [place] [id number] AM-PAC 6 Clicks   Basic Mobility Inpatient Short Form  How much difficulty does the patient currently have. .. Unable A Lot A Little None   1. Turning over in bed (including adjusting bedclothes, sheets and blankets)? [] 1   [] 2   [x] 3   [] 4   2. Sitting down on and standing up from a chair with arms ( e.g., wheelchair, bedside commode, etc.)   [] 1   [] 2   [x] 3   [] 4   3. Moving from lying on back to sitting on the side of the bed? [] 1   [] 2   [x] 3   [] 4   How much help from another person does the patient currently need. .. Total A Lot A Little None   4. Moving to and from a bed to a chair (including a wheelchair)? [] 1   [] 2   [x] 3   [] 4   5. Need to walk in hospital room? [] 1   [] 2   [x] 3   [] 4   6. Climbing 3-5 steps with a railing? [] 1   [] 2   [x] 3   [] 4   © , Trustees of [address] [place] [address], under license to [employer].  All rights reserved        Score:  Initial: 18 Most Recent: X (Date: -- )    Interpretation of Tool:  Represents activities that are increasingly more difficult (i.e. Bed mobility, Transfers, Gait). Score 24 23 22-20 19-15 14-10 9-7 6     Modifier CH CI CJ CK CL CM CN      ? Mobility - Walking and Moving Around:     - CURRENT STATUS: CK - 40%-59% impaired, limited or restricted    - GOAL STATUS: CK - 40%-59% impaired, limited or restricted    - D/C STATUS:  CK - 40%-59% impaired, limited or restricted  Payor:  / Plan: Hull Raddle / Product Type:  /      Medical Necessity:     · Skilled intervention continues to be required due to decreased mobility ability. Reason for Services/Other Comments:  · Patient continues to require skilled intervention due to decreased mobility ability. Use of outcome tool(s) and clinical judgement create a POC that gives a: Clear prediction of patient's progress: LOW COMPLEXITY            TREATMENT:   (In addition to Assessment/Re-Assessment sessions the following treatments were rendered)     Pre-treatment Symptoms/Complaints:  5/10 pain  Pain: Initial:   Pain Intensity 1: 5  Pain Location 1: Knee  Pain Orientation 1: Left  Post Session:  5/10 pain     Assessment/Reassessment only, no treatment provided today      Date:   Date:   Date:     ACTIVITY/EXERCISE AM PM AM PM AM PM   GROUP THERAPY  []  []  []  []  []  []   Ankle Pumps         Quad Sets         Gluteal Sets         Hip ABd/ADduction         Straight Leg Raises         Knee Slides         Short Arc Quads         Long Arc Quads         Chair Slides                  B = bilateral; AA = active assistive; A = active; P = passive      Treatment/Session Assessment:     Response to Treatment:  Pt agreeable to take steps to head of bed.     Education:  [] Home Exercises  [] Fall Precautions  [] Hip Precautions [] D/C Instruction Review  [] Knee/Hip Prosthesis Review  [x] Walker Management/Safety [] Adaptive Equipment as Needed       Interdisciplinary Collaboration:   o Physical Therapist  o Occupational Therapist  o Registered Nurse After treatment position/precautions:   o Supine in bed  o Bed/Chair-wheels locked  o Bed in low position  o Caregiver at bedside  o Call light within reach  o RN notified    Compliance with Program/Exercises: compliant most of the time. Recommendations/Intent for next treatment session:  Treatment next visit will focus on increasing Ms. Lagunas's independence with bed mobility, transfers, gait training, strength/ROM exercises, modalities for pain, and patient education.       Total Treatment Duration:  PT Patient Time In/Time Out  Time In: 1610  Time Out: [address], [place]

## 2018-04-25 NOTE — PERIOP NOTES
TRANSFER - OUT REPORT:    Verbal report given to Beatriz(name) on [address]  being transferred to [facility] area(unit) for routine progression of care       Report consisted of patients Situation, Background, Assessment and   Recommendations(SBAR). Information from the following report(s) SBAR and MAR was reviewed with the receiving nurse. Lines:   Peripheral IV 82/10/68 Left Cephalic (Active)   Site Assessment Clean, dry, & intact 4/25/2018  7:54 AM   Phlebitis Assessment 0 4/25/2018  7:54 AM   Infiltration Assessment 0 4/25/2018  7:54 AM   Dressing Status Clean, dry, & intact 4/25/2018  7:54 AM   Dressing Type Transparent;Tape 4/25/2018  7:54 AM   Hub Color/Line Status Green; Infusing 4/25/2018  7:54 AM        Opportunity for questions and clarification was provided.       Patient transported with:   Tech   BS=91

## 2018-04-25 NOTE — PERIOP NOTES
TRANSFER - IN REPORT:    Verbal report received from ALEXYS FR[place] on [place]  being received from ortho for routine progression of care      Report consisted of patients Situation, Background, Assessment and   Recommendations(SBAR). Information from the following report(s) SBAR was reviewed with the receiving nurse. Opportunity for questions and clarification was provided. Assessment completed upon patients arrival to unit and care assumed.

## 2018-04-25 NOTE — H&P
The patient has end stage arthritis of the left knee. The patient was see and examined and there are no changes to the patient's orthopedic condition. They have tried conservative treatment for this condition; including antiinflammatories and lifestyle modifications and have failed. The necessity for the joint replacement is still present, and the H&P from the office is still current. The patient will be admitted today for Procedure(s) (LRB):  KNEE ARTHROPLASTY TOTAL/ LEFT/ DYANA/ FNB /ANCEF 2gm / VANCO ([place] - 4/24/2018) (Left) .

## 2018-04-25 NOTE — ANESTHESIA PROCEDURE NOTES
Peripheral Block    Start time: 4/25/2018 9:05 AM  End time: 4/25/2018 9:07 AM  Performed by: Neeta Bailey  Authorized by: Neeta Bailey       Pre-procedure: Indications: at surgeon's request and post-op pain management    Preanesthetic Checklist: patient identified, risks and benefits discussed, site marked, timeout performed, anesthesia consent given and patient being monitored    Timeout Time: 09:05          Block Type:   Block Type:   Adductor canal  Laterality:  Left  Monitoring:  Standard ASA monitoring, responsive to questions, continuous pulse ox, oxygen, frequent vital sign checks and heart rate  Injection Technique:  Single shot  Procedures: ultrasound guided    Patient Position: supine  Prep: chlorhexidine    Location:  Mid thigh  Needle Type:  Stimuplex  Needle Gauge:  22 G  Needle Localization:  Ultrasound guidance  Medication Injected:  0.5%  ropivacaine  Volume (mL):  20  dexamethasone    Assessment:  Number of attempts:  1  Injection Assessment:  Incremental injection every 5 mL, negative aspiration for CSF, ultrasound image on chart, no paresthesia, local visualized surrounding nerve on ultrasound, negative aspiration for blood and no intravascular symptoms  Patient tolerance:  Patient tolerated the procedure well with no immediate complications

## 2018-04-25 NOTE — IP AVS SNAPSHOT
[address] 
[phone] Patient: Oracio Valles MRN: [id number] JYS:[id number][birth year]39[birth year]06 About your hospitalization You were admitted on:  April 25, 2018 You last received care in the:  [facility] You were discharged on:  April 27, 2018 Why you were hospitalized Your primary diagnosis was:  Status Post Total Left Knee Replacement Follow-up Information Follow up With Details Comments Contact Info Claude Webster MD  As needed Formerly [facility] [address] 
[phone] Pretty Feliz MD  As scheduled by office [address] [address] 
[phone] [address] contact you within 48 hrs [address] [facility] [address] [facility] [address] 
[phone] Discharge Orders Procedure Order Date Status Priority Quantity Spec Type Associated Dx CALL YOUR DOCTOR For: Temperature greater than 100.4., Severe uncontrolled pain. , Persistant nausea and vomiting., Persistant dizziness or light-headedness. , Hives, Difficulty breathing, headache, or visual disturbances. , Redness, tenderness, or s. .. 04/25/18 1405 Normal Routine 1  Status post total left knee replacement [[id number]] Questions: For:  Temperature greater than 100.4. For:  Severe uncontrolled pain. For:  Persistant nausea and vomiting. For:  Persistant dizziness or light-headedness. For:  Verdis Bring For:  Difficulty breathing, headache, or visual disturbances. For:  Redness, tenderness, or signs of infection. ACTIVITY AFTER DISCHARGE Patient should: Restrict driving, Restrict lifting, Other (specify) 04/25/18 1405 Normal Routine 1  Status post total left knee replacement [[id number]] Questions: Patient should:  Restrict driving Patient should:  Restrict lifting Patient should: Other (specify) DRESSING, CHANGE SPECIFY 04/25/18 1405 Normal Routine 1  Status post total left knee replacement [[id number]] Comments:  Routine dressing changes. Notify if excessive drainage. If staples are present they are to be removed 10 days post surgery and steri strips applied. REFERRAL TO HOME HEALTH 04/25/18 1405 Normal Routine 1  Status post total left knee replacement [[id number]] REFERRAL TO PHYSICAL THERAPY 04/25/18 1405 Normal Routine 1  Status post total left knee replacement [[id number]] Comments:  Referral to Home PT A check aleah indicates which time of day the medication should be taken. My Medications START taking these medications Instructions Each Dose to Equal  
 Morning Noon Evening Bedtime  
 aspirin delayed-release 81 mg tablet Your next dose is: Today Take 1 Tab by mouth every twelve (12) hours every twelve (12) hours for 35 days. 81 mg HYDROmorphone 2 mg tablet Commonly known as:  DILAUDID Your next dose is: Today Take 1 Tab by mouth every four (4) hours as needed. Max Daily Amount: 12 mg.  
 2 mg  
    
   
   
  
   
  
 ondansetron 4 mg disintegrating tablet Commonly known as:  ZOFRAN ODT Your next dose is: Today Take 1 Tab by mouth every six (6) hours as needed for Nausea (1 to 2 tab). Indications: PREVENTION OF POST-OPERATIVE NAUSEA AND VOMITING  
 4 mg CONTINUE taking these medications Instructions Each Dose to Equal  
 Morning Noon Evening Bedtime ALPRAZolam 0.25 mg tablet Commonly known as:  Shermon Colltammie Your next dose is: Today 1 by mouth 3 times a day prn Severe anxiety  
     
   
   
  
   
  
 ergocalciferol 50,000 unit capsule Commonly known as:  ERGOCALCIFEROL Your next dose is:  Tomorrow Take 50,000 Units by mouth. 26596 Units  
    
  
   
   
   
  
 fluconazole 150 mg tablet Commonly known as:  DIFLUCAN  
 Your next dose is:  Tomorrow Take 1 Tab by mouth daily. FDA advises cautious prescribing of oral fluconazole in pregnancy. 150 mg  
    
  
   
   
   
  
 TYLENOL 325 mg tablet Generic drug:  acetaminophen Your next dose is: Today Take 325 mg by mouth every four (4) hours as needed for Pain. 325 mg  
    
   
   
  
   
  
 VALTREX 1 gram tablet Generic drug:  valACYclovir Your next dose is:  Tomorrow Take  by mouth daily. STOP taking these medications   
 ibuprofen 200 mg tablet Commonly known as:  MOTRIN  
   
  
 mupirocin calcium 2 % nasal ointment Commonly known as:  [facility] Where to Get Your Medications Information on where to get these meds will be given to you by the nurse or doctor. ! Ask your nurse or doctor about these medications  
  aspirin delayed-release 81 mg tablet HYDROmorphone 2 mg tablet  
 ondansetron 4 mg disintegrating tablet Opioid Education Prescription Opioids: What You Need to Know: 
 
Prescription opioids can be used to help relieve moderate-to-severe pain and are often prescribed following a surgery or injury, or for certain health conditions. These medications can be an important part of treatment but also come with serious risks. Opioids are strong pain medicines. Examples include hydrocodone, oxycodone, fentanyl, and morphine. Heroin is an example of an illegal opioid. It is important to work with your health care provider to make sure you are getting the safest, most effective care. WHAT ARE THE RISKS AND SIDE EFFECTS OF OPIOID USE? Prescription opioids carry serious risks of addiction and overdose, especially with prolonged use. An opioid overdose, often marked by slow breathing, can cause sudden death. The use of prescription opioids can have a number of side effects as well, even when taken as directed. · Tolerance-meaning you might need to take more of a medication for the same pain relief · Physical dependence-meaning you have symptoms of withdrawal when the medication is stopped. Withdrawal symptoms can include nausea, sweating, chills, diarrhea, stomach cramps, and muscle aches. Withdrawal can last up to several weeks, depending on which drug you took and how long you took it. · Increased sensitivity to pain · Constipation · Nausea, vomiting, and dry mouth · Sleepiness and dizziness · Confusion · Depression · Low levels of testosterone that can result in lower sex drive, energy, and strength · Itching and sweating RISKS ARE GREATER WITH:      
· History of drug misuse, substance use disorder, or overdose · Mental health conditions (such as depression or anxiety) · Sleep apnea · Older age (72 years or older) · Pregnancy Avoid alcohol while taking prescription opioids. Also, unless specifically advised by your health care provider, medications to avoid include: · Benzodiazepines (such as Xanax or Valium) · Muscle relaxants (such as Soma or Flexeril) · Hypnotics (such as Ambien or Lunesta) · Other prescription opioids KNOW YOUR OPTIONS Talk to your health care provider about ways to manage your pain that don't involve prescription opioids. Some of these options may actually work better and have fewer risks and side effects. Options may include: 
· Pain relievers such as acetaminophen, ibuprofen, and naproxen · Some medications that are also used for depression or seizures · Physical therapy and exercise · Counseling to help patients learn how to cope better with triggers of pain and stress. · Application of heat or cold compress · Massage therapy · Relaxation techniques Be Informed Make sure you know the name of your medication, how much and how often to take it, and its potential risks & side effects.  
 
IF YOU ARE PRESCRIBED OPIOIDS FOR PAIN: 
 · Never take opioids in greater amounts or more often than prescribed. Remember the goal is not to be pain-free but to manage your pain at a tolerable level. · Follow up with your primary care provider to: · Work together to create a plan on how to manage your pain. · Talk about ways to help manage your pain that don't involve prescription opioids. · Talk about any and all concerns and side effects. · Help prevent misuse and abuse. · Never sell or share prescription opioids · Help prevent misuse and abuse. · Store prescription opioids in a secure place and out of reach of others (this may include visitors, children, friends, and family). · Safely dispose of unused/unwanted prescription opioids: Find your community drug take-back program or your pharmacy mail-back program, or flush them down the toilet, following guidance from the Food and Drug Administration (www.fda.gov/Drugs/ResourcesForYou). · Visit www.cdc.gov/drugoverdose to learn about the risks of opioid abuse and overdose. · If you believe you may be struggling with addiction, tell your health care provider and ask for guidance or call [address] [place] [address] at [phone]. Discharge Instructions [address] [place] [address] Patient Discharge Instructions Ann Arroyo / [id number] : 1970 Admitted 2018 Discharged: 2018 IF YOU HAVE ANY PROBLEMS ONCE YOU ARE AT HOME CALL THE FOLLOWING NUMBERS:  
Main office number: [phone] Medications · The medications you are to continue on are listed on the medication reconciliation sheet. · Narcotic pain medications as well as supplemental iron can cause constipation. If this occurs try stopping the narcotic pain medication and/or the iron. · It is important that you take the medication exactly as they are prescribed. · Medications which increase your risk of blood clots are listed to stop for 5 weeks after surgery as well as medications or supplements which increase your risk of bleeding complications. · Keep your medication in the bottles provided by the pharmacist and keep a list of the medication names, dosages, and times to be taken in your wallet. · Do not take other medications without consulting your doctor. Important Information Do NOT smoke as this will greatly increase your risk of infection! Resume your prehospital diet. If you have excessive nausea or vomitting call your doctor's office Leg swelling and warmth is normal for 6 months after surgery. If you experience swelling in your leg elevate you leg while laying down with your toes above your heart. If you have sudden onset severe swelling with leg pain call our office. The stitches deep inside take approximately 6 months to dissolve. There will be sharp shooting, stinging and burning pain. This is normal and will resolve between 3-6 months after surgery. Difficulty sleeping is normal following total Knee and Hip replacement. You may try melatonin, an over-the-counter sleep aid or benadryl to help with sleep. Most patients will resume sleeping through the night 8 weeks after surgery. Home Physical Therapy is arranged. Home Health will contact you within 48 hrs of discharge that you have chosen. If you have not received a call within this time frame please contact that provider you chose. You should be given this information before you leave the hospital.  
 
You are at a risk for falls. Use the rolling walker when walking. Patients who have had a joint replacement should not drive if they are still taking narcotic pain mediation during the daytime hours. Most patients wean themselves off of pain medication within 2-5 weeks after surgery. When to Call the office - If you have a temperature greater then 101 - Uncontrolled vomiting - Loose control of your bladder or bowel function - Are unable to bear any weight  
- Need a pain medication refill DISCHARGE SUMMARY from Nurse The following personal items collected during your admission are returned to you:  
Dental Appliance: Dental Appliances: None Vision: Visual Aid: None Hearing Aid:   na 
Jewelry: Jewelry: None Clothing: Clothing: Pants, Shirt, Footwear Other Valuables: Other Valuables: Cell Phone, Zillow Valuables sent to safe:   na 
 
PATIENT INSTRUCTIONS: 
 
After general anesthesia or intravenous sedation, for 24 hours or while taking prescription Narcotics: · Limit your activities · Do not drive and operate hazardous machinery · Do not make important personal or business decisions · Do  not drink alcoholic beverages · If you have not urinated within 8 hours after discharge, please contact your surgeon on call. Report the following to your surgeon: 
· Excessive pain, swelling, redness or odor of or around the surgical area · Temperature over 101 · Nausea and vomiting lasting longer than 4 hours or if unable to take medications · Any signs of decreased circulation or nerve impairment to extremity: change in color, persistent  numbness, tingling, coldness or increase pain · Any questions, call office @ [phone] Keep scheduled follow up appointment. If need to change, call office @ [phone]. *  Please give a list of your current medications to your Primary Care Provider. *  Please update this list whenever your medications are discontinued, doses are 
    changed, or new medications (including over-the-counter products) are added. *  Please carry medication information at all times in case of emergency situations. Total Knee Replacement: What to Expect at Home Your Recovery When you leave the hospital, you should be able to move around with a walker or crutches. But you will need someone to help you at home for the next few weeks or until you have more energy and can move around better. If there is no one to help you at home, you may go to a rehabilitation center. You will go home with a bandage and stitches or staples. Change the bandage as your doctor tells you to. Your doctor will remove your stitches or staples 10 to 21 days after your surgery. You may still have some mild pain, and the area may be swollen for 3 to 6 months after surgery. Your knee will continue to improve for 6 to 12 months. You will probably use a walker for 1 to 3 weeks and then use crutches. When you are ready, you can use a cane. You will probably be able to walk on your own in 4 to 8 weeks. You will need to do months of physical rehabilitation (rehab) after a knee replacement. Rehab will help you strengthen the muscles of the knee and help you regain movement. After you recover, your artificial knee will allow you to do normal daily activities with less pain or no pain at all. You may be able to hike, dance, ride a bike, and play golf. Talk to your doctor about whether you can do more strenuous activities. Always tell your caregivers that you have an artificial knee. How long it will take to walk on your own, return to normal activities, and go back to work depends on your health and how well your rehabilitation (rehab) program goes. The better you do with your rehab exercises, the quicker you will get your strength and movement back. This care sheet gives you a general idea about how long it will take for you to recover. But each person recovers at a different pace. Follow the steps below to get better as quickly as possible. How can you care for yourself at home? Activity ? · Rest when you feel tired. You may take a nap, but do not stay in bed all day. When you sit, use a chair with arms. You can use the arms to help you stand up. ? · Work with your physical therapist to find the best way to exercise. You may be able to take frequent, short walks using crutches or a walker. What you can do as your knee heals will depend on whether your new knee is cemented or uncemented. You may not be able to do certain things for a while if your new knee is uncemented. ? · After your knee has healed enough, you can do more strenuous activities with caution. ¨ You can golf, but use a golf cart, and do not wear shoes with spikes. ¨ You can bike on a flat road or on a stationary bike. Avoid biking up hills. ¨ Your doctor may suggest that you stay away from activities that put stress on your knee. These include tennis or badminton, squash or racquetball, contact sports like football, jumping (such as in basketball), jogging, or running. ¨ Avoid activities where you might fall. These include horseback riding, skiing, and mountain biking. ? · Do not sit for more than 1 hour at a time. Get up and walk around for a while before you sit again. If you must sit for a long time, prop up your leg with a chair or footstool. This will help you avoid swelling. ? · Ask your doctor when you can shower. You may need to take sponge baths until your stitches or staples have been removed. ? · Ask your doctor when you can drive again. It may take up to 8 weeks after knee replacement surgery before it is safe for you to drive. ? · When you get into a car, sit on the edge of the seat. Then pull in your legs, and turn to face the front. ? · You should be able to do many everyday activities 3 to 6 weeks after your surgery. You will probably need to take 4 to 16 weeks off from work. When you can go back to work depends on the type of work you do and how you feel. ? · Ask your doctor when it is okay for you to have sex. ? · Do not lift anything heavier than 10 pounds and do not lift weights for 12 weeks. Diet ? · By the time you leave the hospital, you should be eating your normal diet. If your stomach is upset, try bland, low-fat foods like plain rice, broiled chicken, toast, and yogurt. Your doctor may suggest that you take iron and vitamin supplements. ? · Drink plenty of fluids (unless your doctor tells you not to). ? · Eat healthy foods, and watch your portion sizes. Try to stay at your ideal weight. Too much weight puts more stress on your new knee. ? · You may notice that your bowel movements are not regular right after your surgery. This is common. Try to avoid constipation and straining with bowel movements. You may want to take a fiber supplement every day. If you have not had a bowel movement after a couple of days, ask your doctor about taking a mild laxative. Medicines ? · Your doctor will tell you if and when you can restart your medicines. He or she will also give you instructions about taking any new medicines. ? · If you take blood thinners, such as warfarin (Coumadin), clopidogrel (Plavix), or aspirin, be sure to talk to your doctor. He or she will tell you if and when to start taking those medicines again. Make sure that you understand exactly what your doctor wants you to do.  
? · Your doctor may give you a blood-thinning medicine to prevent blood clots. If you take a blood thinner, be sure you get instructions about how to take your medicine safely. Blood thinners can cause serious bleeding problems. This medicine could be in pill form or as a shot (injection). If a shot is necessary, your doctor will tell you how to do this. ? · Be safe with medicines. Take pain medicines exactly as directed. ¨ If the doctor gave you a prescription medicine for pain, take it as prescribed. ¨ If you are not taking a prescription pain medicine, ask your doctor if you can take an over-the-counter medicine. ¨ Plan to take your pain medicine 30 minutes before exercises.  It is easier to prevent pain before it starts than to stop it once it has started. ? · If you think your pain medicine is making you sick to your stomach: 
¨ Take your medicine after meals (unless your doctor has told you not to). ¨ Ask your doctor for a different pain medicine. ? · If your doctor prescribed antibiotics, take them as directed. Do not stop taking them just because you feel better. You need to take the full course of antibiotics. Incision care ? · You will have a bandage over the cut (incision) and staples or stitches. Take the bandage off when your doctor says it is okay. ? · Your doctor will remove the staples or stitches 10 days to 3 weeks after the surgery and replace them with strips of tape. Leave the tape on for a week or until it falls off. Exercise ? · Your rehab program will give you a number of exercises to do to help you get back your knee's range of motion and strength. Always do them as your therapist tells you. Ice and elevation ? · For pain and swelling, put ice or a cold pack on the area for 10 to 20 minutes at a time. Put a thin cloth between the ice and your skin. Other instructions ? · Continue to wear your support stockings as your doctor says. These help to prevent blood clots. The length of time that you will have to wear them depends on your activity level and the amount of swelling. ? · You have metal pieces in your knee. These may set off some airport metal detectors. Carry a medical alert card that says you have an artificial joint, just in case. Follow-up care is a key part of your treatment and safety. Be sure to make and go to all appointments, and call your doctor if you are having problems. It's also a good idea to know your test results and keep a list of the medicines you take. When should you call for help? Call 911 anytime you think you may need emergency care. For example, call if: 
? · You passed out (lost consciousness). ? · You have severe trouble breathing. ? · You have sudden chest pain and shortness of breath, or you cough up blood. ?Call your doctor now or seek immediate medical care if: 
? · You have signs of infection, such as: 
¨ Increased pain, swelling, warmth, or redness. ¨ Red streaks leading from the incision. ¨ Pus draining from the incision. ¨ A fever. ? · You have signs of a blood clot, such as: 
¨ Pain in your calf, back of the knee, thigh, or groin. ¨ Redness and swelling in your leg or groin. ? · Your incision comes open and begins to bleed, or the bleeding increases. ? · You have pain that does not get better after you take pain medicine. ? Watch closely for changes in your health, and be sure to contact your doctor if: 
? · You do not have a bowel movement after taking a laxative. Where can you learn more? Go to http://ronny-javier.info/. Enter O510 in the search box to learn more about \"Total Knee Replacement: What to Expect at Home. \" Current as of: March 21, 2017 Content Version: 11.4 © 3718-1572 [employer]. Care instructions adapted under license by [employer] (which disclaims liability or warranty for this information). If you have questions about a medical condition or this instruction, always ask your healthcare professional. [address] any warranty or liability for your use of this information. These are general instructions for a healthy lifestyle: No smoking/ No tobacco products/ Avoid exposure to second hand smoke Surgeon General's Warning:  Quitting smoking now greatly reduces serious risk to your health. Obesity, smoking, and sedentary lifestyle greatly increases your risk for illness A healthy diet, regular physical exercise & weight monitoring are important for maintaining a healthy lifestyle You may be retaining fluid if you have a history of heart failure or if you experience any of the following symptoms:  Weight gain of 3 pounds or more overnight or 5 pounds in a week, increased swelling in our hands or feet or shortness of breath while lying flat in bed. Please call your doctor as soon as you notice any of these symptoms; do not wait until your next office visit. Recognize signs and symptoms of STROKE: 
 
F-face looks uneven A-arms unable to move or move even S-speech slurred or non-existent T-time-call 911 as soon as signs and symptoms begin-DO NOT go Back to bed or wait to see if you get better-TIME IS BRAIN. The discharge information has been reviewed with the patient. The patient verbalized understanding. Information obtained by : 
I understand that if any problems occur once I am at home I am to contact my physician. I understand and acknowledge receipt of the instructions indicated above. Physician's or R.N.'s Signature                                                                  Date/Time Patient or Representative Signature                                                          Date/Time Introducing [facility]! Dear Shakira Hogan: Thank you for requesting a [employer] account. Our records indicate that you already have an active [employer] account. You can access your account anytime at [email or web address] Did you know that you can access your hospital and ER discharge instructions at any time in [employer]? You can also review all of your test results from your hospital stay or ER visit. Additional Information If you have questions, please visit the Frequently Asked Questions section of the [employer] website at [email or web address] Remember, [employer] is NOT to be used for urgent needs. For medical emergencies, dial 911. Now available from your iPhone and Android! Introducing Abdirashid Dominguez As a Ava Cabrera patient, I wanted to make you aware of our electronic visit tool called Abdirashid Dominguez. [facility]va Mention Mobile 24/7 allows you to connect within minutes with a medical provider 24 hours a day, seven days a week via a mobile device or tablet or logging into a secure website from your computer. You can access Abdirashid Dominguez from anywhere in the [place]. A virtual visit might be right for you when you have a simple condition and feel like you just dont want to get out of bed, or cant get away from work for an appointment, when your regular [facility] [place] provider is not available (evenings, weekends or holidays), or when youre out of town and need minor care. Electronic visits cost only $49 and if the Ava [employer] provider determines a prescription is needed to treat your condition, one can be electronically transmitted to a nearby pharmacy*. Please take a moment to enroll today if you have not already done so. The enrollment process is free and takes just a few minutes. To enroll, please download the [employer] keith to your tablet or phone, or visit [email or web address] to enroll on your computer. And, as an [address] [place] [address] patient with a [employer] account, the results of your visits will be scanned into your electronic medical record and your primary care provider will be able to view the scanned results. We urge you to continue to see your regular Ava Cabrera provider for your ongoing medical care. And while your primary care provider may not be the one available when you seek a Abdirashid Dominguez virtual visit, the peace of mind you get from getting a real diagnosis real time can be priceless. For more information on Abdirashid Jamesonnehal, view our Frequently Asked Questions (FAQs) at [email or web address]. Sincerely, 
 
Feli Lawton MD 
Chief Medical Officer [address] *:  certain medications cannot be prescribed via Abdirashid Jamesonnehal Unresulted Labs-Please follow up with your PCP about these lab tests Order Current Status CULTURE, ANAEROBIC In process Providers Seen During Your Hospitalization Provider Specialty Primary office phone Arturo Rucker MD Orthopedic Surgery [phone] Immunizations Administered for This Admission Name Date  
 TB Skin Test (PPD) Intradermal  Deferred (), 4/25/2018 Your Primary Care Physician (PCP) Primary Care Physician Office Phone Office Fax Rafa Melo [phone] You are allergic to the following Allergen Reactions Codeine Rash Erythromycin Rash Recent Documentation Height Weight Breastfeeding? BMI OB Status Smoking Status 1.676 m 69.4 kg No 24.69 kg/m2 Having regular periods Never Smoker Emergency Contacts Name Discharge Info Relation Home Work Mobile Irene Jack DISCHARGE CAREGIVER [3] Mother [14] [phone] [address] CAREGIVER [3] Spouse [3]   [phone] Patient Belongings The following personal items are in your possession at time of discharge: 
  Dental Appliances: None  Visual Aid: None      Home Medications: None   Jewelry: None  Clothing: Pants, Shirt, Footwear    Other Valuables: Cell Phone, Angie Calk Please provide this summary of care documentation to your next provider. Signatures-by signing, you are acknowledging that this After Visit Summary has been reviewed with you and you have received a copy. Patient Signature:  ____________________________________________________________ Date:  ____________________________________________________________  
  
Lisandra Fines Provider Signature:  ____________________________________________________________ Date:  ____________________________________________________________

## 2018-04-26 LAB
ANION GAP SERPL CALC-SCNC: 6 MMOL/L (ref 7–16)
BUN SERPL-MCNC: 6 MG/DL (ref 6–23)
CALCIUM SERPL-MCNC: 7.7 MG/DL (ref 8.3–10.4)
CHLORIDE SERPL-SCNC: 105 MMOL/L (ref 98–107)
CO2 SERPL-SCNC: 30 MMOL/L (ref 21–32)
CREAT SERPL-MCNC: 0.63 MG/DL (ref 0.6–1)
GLUCOSE SERPL-MCNC: 122 MG/DL (ref 65–100)
HGB BLD-MCNC: 10.8 G/DL (ref 11.7–15.4)
MM INDURATION POC: 0 MM (ref 0–5)
POTASSIUM SERPL-SCNC: 3.5 MMOL/L (ref 3.5–5.1)
PPD POC: NORMAL NEGATIVE
SODIUM SERPL-SCNC: 141 MMOL/L (ref 136–145)

## 2018-04-26 PROCEDURE — 97116 GAIT TRAINING THERAPY: CPT

## 2018-04-26 PROCEDURE — 74011250637 HC RX REV CODE- 250/637: Performed by: ORTHOPAEDIC SURGERY

## 2018-04-26 PROCEDURE — 97535 SELF CARE MNGMENT TRAINING: CPT

## 2018-04-26 PROCEDURE — 74011250636 HC RX REV CODE- 250/636: Performed by: PHYSICIAN ASSISTANT

## 2018-04-26 PROCEDURE — 97110 THERAPEUTIC EXERCISES: CPT

## 2018-04-26 PROCEDURE — 85018 HEMOGLOBIN: CPT | Performed by: PHYSICIAN ASSISTANT

## 2018-04-26 PROCEDURE — 36415 COLL VENOUS BLD VENIPUNCTURE: CPT | Performed by: PHYSICIAN ASSISTANT

## 2018-04-26 PROCEDURE — 97150 GROUP THERAPEUTIC PROCEDURES: CPT

## 2018-04-26 PROCEDURE — 94760 N-INVAS EAR/PLS OXIMETRY 1: CPT

## 2018-04-26 PROCEDURE — 74011250637 HC RX REV CODE- 250/637: Performed by: PHYSICIAN ASSISTANT

## 2018-04-26 PROCEDURE — 80048 BASIC METABOLIC PNL TOTAL CA: CPT | Performed by: PHYSICIAN ASSISTANT

## 2018-04-26 PROCEDURE — 65270000029 HC RM PRIVATE

## 2018-04-26 RX ADMIN — ACETAMINOPHEN 1000 MG: 500 TABLET, FILM COATED ORAL at 17:07

## 2018-04-26 RX ADMIN — ONDANSETRON 4 MG: 2 INJECTION INTRAMUSCULAR; INTRAVENOUS at 14:29

## 2018-04-26 RX ADMIN — HYDROMORPHONE HYDROCHLORIDE 1 MG: 2 INJECTION, SOLUTION INTRAMUSCULAR; INTRAVENOUS; SUBCUTANEOUS at 06:04

## 2018-04-26 RX ADMIN — ASPIRIN 81 MG: 81 TABLET, COATED ORAL at 08:56

## 2018-04-26 RX ADMIN — Medication 5 ML: at 12:33

## 2018-04-26 RX ADMIN — HYDROMORPHONE HYDROCHLORIDE 1 MG: 2 INJECTION, SOLUTION INTRAMUSCULAR; INTRAVENOUS; SUBCUTANEOUS at 08:55

## 2018-04-26 RX ADMIN — ONDANSETRON 4 MG: 2 INJECTION INTRAMUSCULAR; INTRAVENOUS at 23:01

## 2018-04-26 RX ADMIN — HYDROMORPHONE HYDROCHLORIDE 1 MG: 2 INJECTION, SOLUTION INTRAMUSCULAR; INTRAVENOUS; SUBCUTANEOUS at 12:29

## 2018-04-26 RX ADMIN — DIPHENHYDRAMINE HYDROCHLORIDE 25 MG: 25 CAPSULE ORAL at 00:41

## 2018-04-26 RX ADMIN — ONDANSETRON 4 MG: 2 INJECTION INTRAMUSCULAR; INTRAVENOUS at 19:10

## 2018-04-26 RX ADMIN — ASPIRIN 81 MG: 81 TABLET, COATED ORAL at 20:10

## 2018-04-26 RX ADMIN — HYDROMORPHONE HYDROCHLORIDE 1 MG: 2 INJECTION, SOLUTION INTRAMUSCULAR; INTRAVENOUS; SUBCUTANEOUS at 20:10

## 2018-04-26 RX ADMIN — ACETAMINOPHEN 1000 MG: 500 TABLET, FILM COATED ORAL at 11:37

## 2018-04-26 RX ADMIN — FLUCONAZOLE 200 MG: 100 TABLET ORAL at 00:00

## 2018-04-26 RX ADMIN — CELECOXIB 200 MG: 200 CAPSULE ORAL at 20:10

## 2018-04-26 RX ADMIN — ACETAMINOPHEN 1000 MG: 500 TABLET, FILM COATED ORAL at 06:04

## 2018-04-26 RX ADMIN — ALPRAZOLAM 0.25 MG: 0.5 TABLET ORAL at 20:10

## 2018-04-26 RX ADMIN — HYDROMORPHONE HYDROCHLORIDE 1 MG: 2 INJECTION, SOLUTION INTRAMUSCULAR; INTRAVENOUS; SUBCUTANEOUS at 23:01

## 2018-04-26 RX ADMIN — CELECOXIB 200 MG: 200 CAPSULE ORAL at 08:56

## 2018-04-26 RX ADMIN — DIPHENHYDRAMINE HYDROCHLORIDE 25 MG: 25 CAPSULE ORAL at 04:18

## 2018-04-26 RX ADMIN — HYDROMORPHONE HYDROCHLORIDE 2 MG: 2 TABLET ORAL at 21:29

## 2018-04-26 RX ADMIN — HYDROMORPHONE HYDROCHLORIDE 1 MG: 2 INJECTION, SOLUTION INTRAMUSCULAR; INTRAVENOUS; SUBCUTANEOUS at 17:06

## 2018-04-26 RX ADMIN — HYDROMORPHONE HYDROCHLORIDE 1 MG: 2 INJECTION, SOLUTION INTRAMUSCULAR; INTRAVENOUS; SUBCUTANEOUS at 03:20

## 2018-04-26 RX ADMIN — DIPHENHYDRAMINE HYDROCHLORIDE 25 MG: 25 CAPSULE ORAL at 21:29

## 2018-04-26 RX ADMIN — Medication 2 G: at 00:41

## 2018-04-26 NOTE — PROGRESS NOTES
Resting in recliner. Family in room. Aquacel dry and intact to L knee with iceman. Good movement and sensation to LEs. Medicated for pain with dilaudid IV.

## 2018-04-26 NOTE — PROGRESS NOTES
Problem: Self Care Deficits Care Plan (Adult)  Goal: *Acute Goals and Plan of Care (Insert Text)  GOALS:   DISCHARGE GOALS (in preparation for going home/rehab):  3 days  1. Ms. Renay Quinonez will perform one lower body dressing activity with minimal assistance required to demonstrate improved functional mobility and safety. met  2. Ms. Renay Quinonez will perform one lower body bathing activity with minimal assistance required to demonstrate improved functional mobility and safety. met  3. Ms. Renay Quinonez will perform toileting/toilet transfer with contact guard assistance to demonstrate improved functional mobility and safety. met  4. Ms. Renay Quinonez will perform shower transfer with contact guard assistance to demonstrate improved functional mobility and safety. met    JOINT CAMP OCCUPATIONAL THERAPY TKA: Daily Note, Discharge and AM 4/26/2018  INPATIENT: Hospital Day: 2  Payor: SARAY / Plan: [address] Dobb[address] / Product Type: [employer] /      NAME/AGE/GENDER: Paul Rios is a 50 y.o. female   PRIMARY DIAGNOSIS:  Primary osteoarthritis of left knee [M17.12]   Procedure(s) and Anesthesia Type:     * KNEE ARTHROPLASTY TOTAL/ LEFT/ DYANA/ FNB /ANCEF 2gm / VANCO ([place] - 4/24/2018) - Spinal (Left)  ICD-10: Treatment Diagnosis:    · Pain in Left Knee (M25.562)  · Stiffness of Left Knee, Not elsewhere classified (I98.466)  · Generalized Muscle Weakness (M62.81)  · Other lack of cordination (R27.8)      ASSESSMENT:     Ms. Renay Quinonez  is s/p L TKA and presents with decreased weight bearing on L LE and decreased independence with functional mobility and activities of daily living. Patient completed shower and dressing as charter below in ADL grid and is ambulating with rolling walker and contact guard assist.  Patient has met 4/4 goals and plans to return home with good family support. Family able to provide patient with appropriate level of assistance at this time.   OT reviewed safety precautions throughout session and therapy schedule for the remainder of today. Patient instructed to call for assistance when needing to get up from recliner and all needs in reach. Patient verbalized understanding of call light. This section established at most recent assessment   PROBLEM LIST (Impairments causing functional limitations):  1. Decreased Strength  2. Decreased ADL/Functional Activities  3. Decreased Transfer Abilities  4. Increased Pain  5. Increased Fatigue  6. Decreased Flexibility/Joint Mobility  7. Decreased Knowledge of Precautions   INTERVENTIONS PLANNED: (Benefits and precautions of occupational therapy have been discussed with the patient.)  1. Activities of daily living training  2. Adaptive equipment training  3. Balance training  4. Clothing management  5. Donning&doffing training  6. Theraputic activity     TREATMENT PLAN: Frequency/Duration: Follow patient 1 time to address above goals. Rehabilitation Potential For Stated Goals: Excellent     RECOMMENDED REHABILITATION/EQUIPMENT: (at time of discharge pending progress): Continue Skilled Therapy and Home Health: Physical Therapy. OCCUPATIONAL PROFILE AND HISTORY:   History of Present Injury/Illness (Reason for Referral): Pt presents this date s/p (left) TKA. Past Medical History/Comorbidities:   Ms. Lucrecia Lechuga  has a past medical history of Eczema; Herpes simplex; OA (osteoarthritis) of knee; Osteopenia; Status post total left knee replacement (4/25/2018); Ulcerative colitis, chronic ([facility] [address] and Vitamin D deficiency. Ms. Lucrecia Lechuga  has a past surgical history that includes hx orthopaedic (as a teenager); pr abdomen surgery proc unlisted; and hx refractive surgery.   Social History/Living Environment:   Home Environment: Private residence  # Steps to Enter: 4  Rails to Enter: Yes  Hand Rails : Right  One/Two Story Residence: One story  Living Alone: No  Support Systems: Spouse/Significant Other/Partner  Patient Expects to be Discharged to[de-identified] Private residence  Current DME Used/Available at Home: None  Tub or Shower Type: Tub/Shower combination  Prior Level of Function/Work/Activity:  Indep with self care and functional mobility     Number of Personal Factors/Comorbidities that affect the Plan of Care: Brief history (0):  LOW COMPLEXITY   ASSESSMENT OF OCCUPATIONAL PERFORMANCE[de-identified]   Most Recent Physical Functioning:   Balance  Sitting: Intact  Standing: With support                              Mental Status  Neurologic State: Alert  Orientation Level: Oriented X4  Cognition: Follows commands  Perception: Appears intact  Perseveration: No perseveration noted  Safety/Judgement: Awareness of environment; Fall prevention                Basic ADLs (From Assessment) Complex ADLs (From Assessment)   Basic ADL  Feeding: Supervision  Oral Facial Hygiene/Grooming: Supervision  Bathing: Moderate assistance  Type of Bath: Chlorhexidine (CHG), Full, Shower  Upper Body Dressing: Minimum assistance  Lower Body Dressing: Moderate assistance  Toileting: Total assistance     Grooming/Bathing/Dressing Activities of Daily Living   Grooming  Grooming Assistance: Supervision/set up  Brushing/Combing Hair: Supervision/set-up Cognitive Retraining  Safety/Judgement: Awareness of environment; Fall prevention   Upper Body Bathing  Bathing Assistance: Supervision/set-up  Position Performed: Seated in chair;Standing  Cues: Verbal cues provided  Adaptive Equipment: Grab bar; Shower chair     Lower Body Bathing  Bathing Assistance: Supervision/set-up  Perineal  : Supervision/set-up  Position Performed: Seated in chair;Standing  Cues: Verbal cues provided  Adaptive Equipment: Grab bar;Walker  Lower Body : Supervision/set-up  Position Performed: Bending forward method;Seated in chair;Standing  Cues: Verbal cues provided  Adaptive Equipment: Grab bar Toileting  Toileting Assistance: Supervision/set up   Upper Body Dressing Assistance  Dressing Assistance: Supervision/set-up  Bra: Supervision/set-up  Pullover Shirt: Supervision/set-up Functional Transfers  Bathroom Mobility: Contact guard assistance  Toilet Transfer : Contact guard assistance  Shower Transfer: Contact guard assistance  Cues: Verbal cues provided  Adaptive Equipment: Grab bars; Shower chair with back; Walker (comment)   Lower Body Dressing Assistance  Dressing Assistance: Supervision/set up  Underpants: Supervision/set-up  Pants With Elastic Waist: Supervision/set-up  Socks: Compensatory technique training ( socks)  Cues: Verbal cues provided  Adaptive Equipment Used: Grab bar;Walker Bed/Mat Mobility  Supine to Sit: Minimum assistance  Sit to Stand: Contact guard assistance  Bed to Chair: Contact guard assistance  Scooting: Contact guard assistance         Physical Skills Involved:  1. Range of Motion  2. Balance  3. Strength Cognitive Skills Affected (resulting in the inability to perform in a timely and safe manner):  1. NOne Psychosocial Skills Affected:  1. None   Number of elements that affect the Plan of Care: 1-3:  LOW COMPLEXITY   CLINICAL DECISION MAKIN[address] [place] [id number] AM-PAC 6 Clicks   Daily Activity Inpatient Short Form  How much help from another person does the patient currently need. .. Total A Lot A Little None   1. Putting on and taking off regular lower body clothing? [] 1   [] 2   [x] 3   [] 4   2. Bathing (including washing, rinsing, drying)? [] 1   [] 2   [x] 3   [] 4   3. Toileting, which includes using toilet, bedpan or urinal?   [] 1   [] 2   [x] 3   [] 4   4. Putting on and taking off regular upper body clothing? [] 1   [] 2   [] 3   [x] 4   5. Taking care of personal grooming such as brushing teeth? [] 1   [] 2   [] 3   [x] 4   6. Eating meals? [] 1   [] 2   [] 3   [x] 4   © , Trustees of [address] [place] [address], under license to [employer].  All rights reserved     Score:  Initial: 21 Most Recent: X (Date: -- )    Interpretation of Tool:  Represents activities that are increasingly more difficult (i.e. Bed mobility, Transfers, Gait). Score 24 23 22-20 19-15 14-10 9-7 6     Modifier CH CI CJ CK CL CM CN      ? Self Care:     - CURRENT STATUS: CK - 40%-59% impaired, limited or restricted    - GOAL STATUS: CJ - 20%-39% impaired, limited or restricted    - D/C STATUS:  CJ - 20%-39% impaired, limited or restricted  Payor:  / Plan: Winsome Calhoun / Product Type:  /      Medical Necessity:     · Patient is expected to demonstrate progress in balance, coordination and functional technique to decrease assistance required with self care and functional mobility. Reason for Services/Other Comments:  · Patient continues to require skilled intervention due to decreased self care and functional mobility. Use of outcome tool(s) and clinical judgement create a POC that gives a: LOW COMPLEXITY            TREATMENT:   (In addition to Assessment/Re-Assessment sessions the following treatments were rendered)     Pre-treatment Symptoms/Complaints:  None  Pain: Initial:   Pain Intensity 1: 5  Pain Location 1: Knee  Pain Orientation 1: Left  Pain Intervention(s) 1: Repositioned  Post Session:  5     Self Care: (40): Procedure(s) (per grid) utilized to improve and/or restore self-care/home management as related to dressing, bathing, toileting and grooming. Required minimal verbal and   cueing to facilitate activities of daily living skills and compensatory activities. Treatment/Session Assessment:     Response to Treatment:  Tolerated well.     Education:  [] Home Exercises  [x] Fall Precautions  [] Hip Precautions [] Going Home Video  [x] Knee/Hip Prosthesis Review  [x] Walker Management/Safety [x] Adaptive Equipment as Needed       Interdisciplinary Collaboration:   o Occupational Therapist  o Registered Nurse    After treatment position/precautions:   o Up in chair  o Bed/Chair-wheels locked  o Caregiver at bedside  o Call light within reach  o RN notified Compliance with Program/Exercises: compliant all of the time. Recommendations: Pt doing well all goals met and will do well at home with support from home. Patient will be discharged home with home health PT. No further Occupational Therapy warranted, will discharge Occupational Therapy services.       Total Treatment Duration:  OT Patient Time In/Time Out  Time In: 0645  Time Out: Gillian 83, OT

## 2018-04-26 NOTE — PROGRESS NOTES
Problem: Mobility Impaired (Adult and Pediatric)  Goal: *Acute Goals and Plan of Care (Insert Text)  GOALS (1-4 days):  (1.)Ms. Dg Pascual will move from supine to sit and sit to supine  in bed with INDEPENDENT. (2.)Ms. Dg Pascual will transfer from bed to chair and chair to bed with INDEPENDENT using the least restrictive device. (3.)Ms. Dg Pascual will ambulate with INDEPENDENT for 250 feet with the least restrictive device. (4.)Ms. Dg Pascual will ambulate up/down 3 steps with bilateral  railing with MINIMAL ASSIST with no device. (5.)Ms. Dg Pascual will increase left knee ROM to 5°-80°.  ________________________________________________________________________________________________    Outcome: Progressing Towards Goal    PHYSICAL THERAPY Joint camp tKa: Daily Note, Treatment Day: 1st, PM 4/26/2018  INPATIENT: Hospital Day: 2  Payor: SARAY / Plan: Watson Dobb[address] / Product Type: Mallory Geller /      NAME/AGE/GENDER: John Mcallister is a 50 y.o. female   PRIMARY DIAGNOSIS:  Primary osteoarthritis of left knee [M17.12]   Procedure(s) and Anesthesia Type:     * KNEE ARTHROPLASTY TOTAL/ LEFT/ DYANA/ FNB /ANCEF 2gm / VANCO ([place] - 4/24/2018) - Spinal (Left)  ICD-10: Treatment Diagnosis:    · Pain in Left Knee (M25.562)  · Stiffness of Left Knee, Not elsewhere classified (M25.662)  · Difficulty in walking, Not elsewhere classified (R26.2)  · Other abnormalities of gait and mobility (R26.89)      ASSESSMENT:     Ms. Dg Pascual presents status post left total knee replacement with decreased independence with functional mobility. Pt demonstrated increased gait distance this afternoon and was able to increase LE exercise repetitions. She does have significant swelling which is limiting her L LE AROM. Encouraged to elevate and ice. Progressing toward goals. This section established at most recent assessment   PROBLEM LIST (Impairments causing functional limitations):  1. Decreased Strength  2.  Decreased Transfer Abilities  3. Decreased Ambulation Ability/Technique  4. Decreased Balance  5. Increased Pain  6. Decreased Activity Tolerance  7. Decreased Flexibility/Joint Mobility   INTERVENTIONS PLANNED: (Benefits and precautions of physical therapy have been discussed with the patient.)  1. Bed Mobility  2. Gait Training  3. Home Exercise Program (HEP)  4. Therapeutic Exercise/Strengthening  5. Transfer Training  6. Range of Motion: active/assisted/passive  7. Therapeutic Activities  8. Group Therapy     TREATMENT PLAN: Frequency/Duration: Follow patient BID for duration of hospital stay to address above goals. Rehabilitation Potential For Stated Goals: Good     RECOMMENDED REHABILITATION/EQUIPMENT: (at time of discharge pending progress): Home Health: Physical Therapy. HISTORY:   History of Present Injury/Illness (Reason for Referral):  Pt is status post left total knee replacement. Past Medical History/Comorbidities:   Ms. Lila Rondon  has a past medical history of Eczema; Herpes simplex; OA (osteoarthritis) of knee; Osteopenia; Status post total left knee replacement (4/25/2018); Ulcerative colitis, chronic ([facility] [address] and Vitamin D deficiency. Ms. Lila Rondon  has a past surgical history that includes hx orthopaedic (as a teenager); pr abdomen surgery proc unlisted; and hx refractive surgery. Social History/Living Environment:   Home Environment: Private residence  # Steps to Enter: 4  Rails to Enter: Yes  Hand Rails : Right  One/Two Story Residence: One story  Living Alone: No  Support Systems: Spouse/Significant Other/Partner  Patient Expects to be Discharged to[de-identified] Private residence  Current DME Used/Available at Home: None  Tub or Shower Type: Tub/Shower combination  Prior Level of Function/Work/Activity:  Independent with ambulation.    Number of Personal Factors/Comorbidities that affect the Plan of Care: 0: LOW COMPLEXITY   EXAMINATION:   Most Recent Physical Functioning:                 LLE AROM  L Knee Flexion: 70  L Knee Extension: -8          Bed Mobility  Supine to Sit: Minimum assistance  Sit to Supine: Minimum assistance (for L LE)  Scooting: Contact guard assistance    Transfers  Sit to Stand: Stand-by assistance  Stand to Sit: Stand-by assistance  Bed to Chair: Stand-by assistance    Balance  Sitting: Intact  Standing: With support              Weight Bearing Status  Left Side Weight Bearing: As tolerated  Distance (ft): 150 Feet (ft)  Ambulation - Level of Assistance: Stand-by assistance  Assistive Device: Walker, rolling  Speed/Cyndie: Slow  Stance: Left decreased  Gait Abnormalities: Antalgic  Interventions: Manual cues; Safety awareness training;Verbal cues     Braces/Orthotics: none    Left Knee Cold  Type: Cryocuff      Body Structures Involved:  1. Bones  2. Joints  3. Muscles  4. Ligaments Body Functions Affected:  1. Neuromusculoskeletal  2. Movement Related Activities and Participation Affected:  1. Mobility   Number of elements that affect the Plan of Care: 4+: HIGH COMPLEXITY   CLINICAL PRESENTATION:   Presentation: Stable and uncomplicated: LOW COMPLEXITY   CLINICAL DECISION MAKIN [facility] [address] [id number] AM-PAC 6 Clicks   Basic Mobility Inpatient Short Form  How much difficulty does the patient currently have. .. Unable A Lot A Little None   1. Turning over in bed (including adjusting bedclothes, sheets and blankets)? [] 1   [] 2   [x] 3   [] 4   2. Sitting down on and standing up from a chair with arms ( e.g., wheelchair, bedside commode, etc.)   [] 1   [] 2   [x] 3   [] 4   3. Moving from lying on back to sitting on the side of the bed? [] 1   [] 2   [x] 3   [] 4   How much help from another person does the patient currently need. .. Total A Lot A Little None   4. Moving to and from a bed to a chair (including a wheelchair)? [] 1   [] 2   [x] 3   [] 4   5. Need to walk in hospital room? [] 1   [] 2   [x] 3   [] 4   6. Climbing 3-5 steps with a railing?    [] 1   [] 2   [x] 3   [] 4   © 2007, Trustees of [address] [place] [address] [address] [address], under license to [employer]. All rights reserved        Score:  Initial: 18 Most Recent: X (Date: -- )    Interpretation of Tool:  Represents activities that are increasingly more difficult (i.e. Bed mobility, Transfers, Gait). Score 24 23 22-20 19-15 14-10 9-7 6     Modifier CH CI CJ CK CL CM CN      ? Mobility - Walking and Moving Around:     - CURRENT STATUS: CK - 40%-59% impaired, limited or restricted    - GOAL STATUS: CK - 40%-59% impaired, limited or restricted    - D/C STATUS:  CK - 40%-59% impaired, limited or restricted  Payor:  / Plan: Kelton Burks / Product Type:  /      Medical Necessity:     · Skilled intervention continues to be required due to decreased mobility ability. Reason for Services/Other Comments:  · Patient continues to require skilled intervention due to decreased mobility ability. Use of outcome tool(s) and clinical judgement create a POC that gives a: Clear prediction of patient's progress: LOW COMPLEXITY            TREATMENT:   (In addition to Assessment/Re-Assessment sessions the following treatments were rendered)     Pre-treatment Symptoms/Complaints:  L knee pain  Pain: Initial:   Pain Intensity 1: 4  Pain Location 1: Knee  Pain Orientation 1: Left  Pain Intervention(s) 1: Cold pack, Repositioned  Post Session:  4/10 pain     Gait Training (15 Minutes):  Gait training to improve and/or restore physical functioning as related to dynamic movement of knee - left to improve functional gait pattern. Ambulated 150 Feet (ft) with Stand-by assistance using a Walker, rolling and minimal Manual cues; Safety awareness training;Verbal cues related to their knee position and motion to promote proper body mechanics. I  Therapeutic Exercise: (45 Minutes (group)):  Exercises per grid below to improve mobility and strength. Required minimal verbal and manual cues to perform exercises correctly.   Progressed range and repetitions as indicated. Date:  4/26/18 Date:   Date:     ACTIVITY/EXERCISE AM PM AM PM AM PM   GROUP THERAPY  []  [x]  []  []  []  []   Ankle Pumps 10 15       Quad Sets 10 115       Gluteal Sets 10 15       Hip ABd/ADduction 10 15       Straight Leg Raises 10 15       Knee Slides 10 15       Short Arc Quads  15       Long Arc Quads         Chair Slides  15                B = bilateral; AA = active assistive; A = active; P = passive      Treatment/Session Assessment:     Response to Treatment:  Pt tolerated well. Education:  [x] Home Exercises  [x] Fall Precautions  [] Hip Precautions [x] D/C Instruction Review  [x] Knee/Hip Prosthesis Review  [x] Walker Management/Safety [] Adaptive Equipment as Needed       Interdisciplinary Collaboration:   o Physical Therapist  o Registered Nurse    After treatment position/precautions:   o Supine in bed  o Bed/Chair-wheels locked  o Bed in low position  o Caregiver at bedside  o Call light within reach    Compliance with Program/Exercises: compliant most of the time. Recommendations/Intent for next treatment session:  Treatment next visit will focus on increasing Ms. Lagunas's independence with bed mobility, transfers, gait training, strength/ROM exercises, modalities for pain, and patient education.       Total Treatment Duration:  PT Patient Time In/Time Out  Time In: 1300  Time Out: [address]
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
## 2018-04-26 NOTE — PROGRESS NOTES
Problem: Mobility Impaired (Adult and Pediatric)  Goal: *Acute Goals and Plan of Care (Insert Text)  GOALS (1-4 days):  (1.)Ms. Lucrecia Lechuga will move from supine to sit and sit to supine  in bed with INDEPENDENT. (2.)Ms. Lucrecia Lechuga will transfer from bed to chair and chair to bed with INDEPENDENT using the least restrictive device. (3.)Ms. Lucrecia Lechuga will ambulate with INDEPENDENT for 250 feet with the least restrictive device. (4.)Ms. Lucrecia Lechuga will ambulate up/down 3 steps with bilateral  railing with MINIMAL ASSIST with no device. (5.)Ms. Lucrecia Lechuga will increase left knee ROM to 5°-80°.  ________________________________________________________________________________________________    Outcome: Progressing Towards Goal    PHYSICAL THERAPY [facility]Ka: Daily Note, Treatment Day: 1st, AM 4/26/2018  INPATIENT: Hospital Day: 2  Payor: SARAY / Plan: [place] So / Product Type: Daysi Holder /      NAME/AGE/GENDER: Hermann Dickerson is a 50 y.o. female   PRIMARY DIAGNOSIS:  Primary osteoarthritis of left knee [M17.12]   Procedure(s) and Anesthesia Type:     * KNEE ARTHROPLASTY TOTAL/ LEFT/ DYANA/ FNB /ANCEF 2gm / VANCO ([place] - 4/24/2018) - Spinal (Left)  ICD-10: Treatment Diagnosis:    · Pain in Left Knee (M25.562)  · Stiffness of Left Knee, Not elsewhere classified (M25.662)  · Difficulty in walking, Not elsewhere classified (R26.2)  · Other abnormalities of gait and mobility (R26.89)      ASSESSMENT:     Ms. Lucrecia Lechuga presents status post left total knee replacement with decreased independence with functional mobility. Pt demonstrated increased gait distance this morning and is progressing toward goals. This section established at most recent assessment   PROBLEM LIST (Impairments causing functional limitations):  1. Decreased Strength  2. Decreased Transfer Abilities  3. Decreased Ambulation Ability/Technique  4. Decreased Balance  5. Increased Pain  6. Decreased Activity Tolerance  7.  Decreased Flexibility/Joint Mobility   INTERVENTIONS PLANNED: (Benefits and precautions of physical therapy have been discussed with the patient.)  1. Bed Mobility  2. Gait Training  3. Home Exercise Program (HEP)  4. Therapeutic Exercise/Strengthening  5. Transfer Training  6. Range of Motion: active/assisted/passive  7. Therapeutic Activities  8. Group Therapy     TREATMENT PLAN: Frequency/Duration: Follow patient BID for duration of hospital stay to address above goals. Rehabilitation Potential For Stated Goals: Good     RECOMMENDED REHABILITATION/EQUIPMENT: (at time of discharge pending progress): Home Health: Physical Therapy. HISTORY:   History of Present Injury/Illness (Reason for Referral):  Pt is status post left total knee replacement. Past Medical History/Comorbidities:   Ms. Erika Robertson  has a past medical history of Eczema; Herpes simplex; OA (osteoarthritis) of knee; Osteopenia; Status post total left knee replacement (4/25/2018); Ulcerative colitis, chronic ([facility] [address] and Vitamin D deficiency. Ms. Erika Robertson  has a past surgical history that includes hx orthopaedic (as a teenager); pr abdomen surgery proc unlisted; and hx refractive surgery. Social History/Living Environment:   Home Environment: Private residence  # Steps to Enter: 4  Rails to Enter: Yes  Hand Rails : Right  One/Two Story Residence: One story  Living Alone: No  Support Systems: Spouse/Significant Other/Partner  Patient Expects to be Discharged to[de-identified] Private residence  Current DME Used/Available at Home: None  Tub or Shower Type: Tub/Shower combination  Prior Level of Function/Work/Activity:  Independent with ambulation.    Number of Personal Factors/Comorbidities that affect the Plan of Care: 0: LOW COMPLEXITY   EXAMINATION:   Most Recent Physical Functioning:                            Bed Mobility  Supine to Sit:  (up in chair upon contact)  Sit to Supine: Minimum assistance  Scooting: Contact guard assistance    Transfers  Sit to Stand: Stand-by assistance;Contact guard assistance  Stand to Sit: Stand-by assistance;Contact guard assistance  Bed to Chair: Stand-by assistance;Contact guard assistance    Balance  Sitting: Intact  Standing: With support              Weight Bearing Status  Left Side Weight Bearing: As tolerated  Distance (ft): 75 Feet (ft)  Ambulation - Level of Assistance: Stand-by assistance;Contact guard assistance  Assistive Device: Walker, rolling  Speed/Cyndie: Slow  Stance: Left decreased  Gait Abnormalities: Antalgic  Interventions: Manual cues; Safety awareness training;Verbal cues     Braces/Orthotics: none    Left Knee Cold  Type: Cryocuff      Body Structures Involved:  1. Bones  2. Joints  3. Muscles  4. Ligaments Body Functions Affected:  1. Neuromusculoskeletal  2. Movement Related Activities and Participation Affected:  1. Mobility   Number of elements that affect the Plan of Care: 4+: HIGH COMPLEXITY   CLINICAL PRESENTATION:   Presentation: Stable and uncomplicated: LOW COMPLEXITY   CLINICAL DECISION MAKIN[address] [place] [id number] AM-PAC 6 Clicks   Basic Mobility Inpatient Short Form  How much difficulty does the patient currently have. .. Unable A Lot A Little None   1. Turning over in bed (including adjusting bedclothes, sheets and blankets)? [] 1   [] 2   [x] 3   [] 4   2. Sitting down on and standing up from a chair with arms ( e.g., wheelchair, bedside commode, etc.)   [] 1   [] 2   [x] 3   [] 4   3. Moving from lying on back to sitting on the side of the bed? [] 1   [] 2   [x] 3   [] 4   How much help from another person does the patient currently need. .. Total A Lot A Little None   4. Moving to and from a bed to a chair (including a wheelchair)? [] 1   [] 2   [x] 3   [] 4   5. Need to walk in hospital room? [] 1   [] 2   [x] 3   [] 4   6. Climbing 3-5 steps with a railing? [] 1   [] 2   [x] 3   [] 4   © , Trustees of [address] [place] [id number], under license to [employer].  All rights reserved        Score: Initial: 18 Most Recent: X (Date: -- )    Interpretation of Tool:  Represents activities that are increasingly more difficult (i.e. Bed mobility, Transfers, Gait). Score 24 23 22-20 19-15 14-10 9-7 6     Modifier CH CI CJ CK CL CM CN      ? Mobility - Walking and Moving Around:     - CURRENT STATUS: CK - 40%-59% impaired, limited or restricted    - GOAL STATUS: CK - 40%-59% impaired, limited or restricted    - D/C STATUS:  CK - 40%-59% impaired, limited or restricted  Payor:  / Plan: [address] / Product Type:  /      Medical Necessity:     · Skilled intervention continues to be required due to decreased mobility ability. Reason for Services/Other Comments:  · Patient continues to require skilled intervention due to decreased mobility ability. Use of outcome tool(s) and clinical judgement create a POC that gives a: Clear prediction of patient's progress: LOW COMPLEXITY            TREATMENT:   (In addition to Assessment/Re-Assessment sessions the following treatments were rendered)     Pre-treatment Symptoms/Complaints:  L knee pain  Pain: Initial:   Pain Intensity 1: 3  Pain Location 1: Knee  Pain Orientation 1: Left  Pain Intervention(s) 1: Cold pack, Repositioned  Post Session:  3/10 pain     Gait Training (13 Minutes):  Gait training to improve and/or restore physical functioning as related to dynamic movement of knee - left to improve functional gait pattern. Ambulated 75 Feet (ft) with Stand-by assistance;Contact guard assistance using a Walker, rolling and minimal Manual cues; Safety awareness training;Verbal cues related to their knee position and motion to promote proper body mechanics. I  Therapeutic Exercise: (12 Minutes):  Exercises per grid below to improve mobility and strength. Required minimal verbal and manual cues to perform exercises correctly. Progressed range and repetitions as indicated.          Date:  4/26/18 Date:   Date: ACTIVITY/EXERCISE AM PM AM PM AM PM   GROUP THERAPY  []  []  []  []  []  []   Ankle Pumps 10        Quad Sets 10        Gluteal Sets 10        Hip ABd/ADduction 10        Straight Leg Raises 10        Knee Slides 10        Short Arc Quads         Long Arc Quads         Chair Slides                  B = bilateral; AA = active assistive; A = active; P = passive      Treatment/Session Assessment:     Response to Treatment:  Pt tolerated well. Education:  [x] Home Exercises  [x] Fall Precautions  [] Hip Precautions [] D/C Instruction Review  [] Knee/Hip Prosthesis Review  [x] Walker Management/Safety [] Adaptive Equipment as Needed       Interdisciplinary Collaboration:   o Physical Therapist  o Registered Nurse    After treatment position/precautions:   o Supine in bed  o Bed/Chair-wheels locked  o Bed in low position  o Caregiver at bedside  o Call light within reach    Compliance with Program/Exercises: compliant most of the time. Recommendations/Intent for next treatment session:  Treatment next visit will focus on increasing Ms. Lagunas's independence with bed mobility, transfers, gait training, strength/ROM exercises, modalities for pain, and patient education.       Total Treatment Duration:  PT Patient Time In/Time Out  Time In: Servando  Time Out: Moises Acevedo PT

## 2018-04-26 NOTE — PROGRESS NOTES
Care Management Interventions  Mode of Transport at Discharge: Self  Transition of Care Consult (CM Consult): [address]: Yes  Discharge Durable Medical Equipment: No  Physical Therapy Consult: Yes  Current Support Network: Lives with Spouse  Confirm Follow Up Transport: Family  Plan discussed with Pt/Family/Caregiver: Yes  Freedom of Choice Offered: Yes  Discharge Location  Discharge Placement: Home with home health    Patient is a 50y.o. year old female admitted for Left TKA . Patient lives with Her spouse and plans to return home on discharge. Order received to arrange home health. Patient requesting [facility] who her father has used. [facility] declined the referral-states she cannot accept  as primary coverage. Patient approved referral to [facility] but asked for a female therapist.   Referral sent to [facility]. Ora Smoke with [facility] aware of her request.   Patient denies any equipment needs as she has a walker. Will follow until discharge.   Alondra Rodriguez

## 2018-04-26 NOTE — PROGRESS NOTES
Dilaudid 1 mg slow IVP for c/o pain. Slept at intervals during shift. No further c/o voiced. No change in NV status noted. Family member at bedside. Call light within reach.

## 2018-04-26 NOTE — PROGRESS NOTES
2018         Post Op day: 1 Day Post-OpProcedure(s) (LRB):  KNEE ARTHROPLASTY TOTAL/ LEFT/ DYANA/ FNB /ANCEF 2gm / VANCO ([place] - 2018) (Left)      Admit Date: 2018  Admit Diagnosis: Primary osteoarthritis of left knee [M17.12]    LAB:    Recent Results (from the past 24 hour(s))   TYPE & SCREEN    Collection Time: 18  8:10 AM   Result Value Ref Range    Crossmatch Expiration 2018     ABO/Rh(D) A POSITIVE     Antibody screen NEG    GLUCOSE, POC    Collection Time: 18  8:19 AM   Result Value Ref Range    Glucose (POC) 91 65 - 100 mg/dL   HCG URINE, QL. - POC    Collection Time: 18  8:21 AM   Result Value Ref Range    Pregnancy test,urine (POC) NEGATIVE  NEG     HEMOGLOBIN    Collection Time: 18  8:06 PM   Result Value Ref Range    HGB 11.6 (L) 11.7 - 15.4 g/dL   HEMOGLOBIN    Collection Time: 18  3:53 AM   Result Value Ref Range    HGB 10.8 (L) 11.7 - 93.8 g/dL   METABOLIC PANEL, BASIC    Collection Time: 18  3:53 AM   Result Value Ref Range    Sodium 141 136 - 145 mmol/L    Potassium 3.5 3.5 - 5.1 mmol/L    Chloride 105 98 - 107 mmol/L    CO2 30 21 - 32 mmol/L    Anion gap 6 (L) 7 - 16 mmol/L    Glucose 122 (H) 65 - 100 mg/dL    BUN 6 6 - 23 MG/DL    Creatinine 0.63 0.6 - 1.0 MG/DL    GFR est AA >60 >60 ml/min/1.73m2    GFR est non-AA >60 >60 ml/min/1.73m2    Calcium 7.7 (L) 8.3 - 10.4 MG/DL     Vital Signs:    Patient Vitals for the past 8 hrs:   BP Temp Pulse Resp SpO2   18 0529 119/67 98 °F (36.7 °C) 75 16 95 %   18 0053 124/71 97.6 °F (36.4 °C) 79 16 94 %     Temp (24hrs), Av °F (36.7 °C), Min:97.6 °F (36.4 °C), Max:98.5 °F (36.9 °C)    Body mass index is 24.69 kg/(m^2).   Pain Control:   Pain Assessment  Pain Scale 1: Numeric (0 - 10)  Pain Intensity 1: 6  Pain Onset 1:  (IV to R hand infiltrated in OR)  Pain Location 1: Knee  Pain Orientation 1: Left  Pain Description 1: Aching  Pain Intervention(s) 1: Medication (see MAR)    Subjective: Doing well, No complaints, No SOB, No Chest Pain, No Nausea or Vomitting     Objective: Vital Signs are Stable, No Acute Distress, Alert and Oriented, Dressing is Dry,  Neurovascular exam is normal.       PT/OT:            Assistive Device: Walker (comment)        LLE AROM  L Knee Flexion: 60  L Knee Extension: 30       Weight Bearing Status: WBAT    Meds:  [unfilled]  [unfilled]  [unfilled]    Assessment:   Patient Active Problem List   Diagnosis Code    Eczema L30.9    Osteopenia M85.80    Ulcerative colitis, chronic (HCC) K51.90    OA (osteoarthritis) of knee M17.10    Health care maintenance Z00.00    Lipoma of left thigh D17.24    Vitamin D deficiency E55.9    Snoring R06.83    Status post total left knee replacement Z96.652             Plan: Continue Physical Therapy, Monitor  Hbg, home tomorrow.         Signed By: Radha King MD

## 2018-04-26 NOTE — PROGRESS NOTES
04/25/18 2035   Oxygen Therapy   O2 Sat (%) 94 %   Pulse via Oximetry 74 beats per minute   O2 Device Room air   O2 Flow Rate (L/min) 0 l/min   Patient achieved  2000  Ml/sec on IS. Patient encouraged to do 10 breaths every hour while awake-patient agreed and demonstrated. No shortness of breath or distress noted. BS are clear b/l. [facility] notes reviewed- pt placed on Sat monitor #9 at this time. Alarms set and functioning properly. Will monitor.

## 2018-04-26 NOTE — PROGRESS NOTES
Sitting up in bed eating. Medicated for pain again with dilaudid IV per pt request prior to group therapy.  in room.

## 2018-04-26 NOTE — PROGRESS NOTES
[address] [facility]  Face to Face Encounter    Patients Name: Amador Roe    YOB: 1970    Ordering Physician: Ayo Rowley    Primary Diagnosis: Primary osteoarthritis of left knee [M17.12]  S/p left TKA    Date of Face to Face:   4-25-18                                Face to Face Encounter findings are related to primary reason for home care:   yes. 1. I certify that the patient needs intermittent care as follows: physical therapy: gait/stair training    2. I certify that this patient is homebound, that is: 1) patient requires the use of a walker device, special transportation, or assistance of another to leave the home; or 2) patient's condition makes leaving the home medically contraindicated; and 3) patient has a normal inability to leave the home and leaving the home requires considerable and taxing effort. Patient may leave the home for infrequent and short duration for medical reasons, and occasional absences for non-medical reasons. Homebound status is due to the following functional limitations: Patient's ambulation limited secondary to severe pain and requires the use of an assistive device and the assistance of a caregiver for safe completion. Patient with strength and ROM deficits limiting ambulation endurance requiring the use of an assistive device and the assistance of a caregiver. Patient deemed temporarily homebound secondary to increased risk for infection when leaving home and going out into the community. 3. I certify that this patient is under my care and that I, or a nurse practitioner or  [id number], or clinical nurse specialist, or certified nurse midwife, working with me, had a Face-to-Face Encounter that meets the physician Face-to-Face Encounter requirements.   The following are the clinical findings from the [address] [place] [address] encounter that support the need for skilled services and is a summary of the encounter: see hospital chart      Dilloneden Miller ELIZABETH  4/26/2018      THE FOLLOWING TO BE COMPLETED BY THE COMMUNITY PHYSICIAN:    I concur with the findings described above from the F2F encounter that this patient is homebound and in need of a skilled service.     Certifying Physician: _____________________________________      Printed Certifying Physician Name: _____________________________________    Date: _________________

## 2018-04-27 VITALS
SYSTOLIC BLOOD PRESSURE: 112 MMHG | OXYGEN SATURATION: 90 % | TEMPERATURE: 98.1 F | RESPIRATION RATE: 18 BRPM | WEIGHT: 153 LBS | DIASTOLIC BLOOD PRESSURE: 71 MMHG | HEART RATE: 89 BPM | HEIGHT: 66 IN | BODY MASS INDEX: 24.59 KG/M2

## 2018-04-27 LAB
BACTERIA SPEC CULT: NORMAL
GRAM STN SPEC: NORMAL
GRAM STN SPEC: NORMAL
HGB BLD-MCNC: 11 G/DL (ref 11.7–15.4)
MM INDURATION POC: 0 MM (ref 0–5)
PPD POC: NORMAL NEGATIVE
SERVICE CMNT-IMP: NORMAL

## 2018-04-27 PROCEDURE — 74011250636 HC RX REV CODE- 250/636: Performed by: PHYSICIAN ASSISTANT

## 2018-04-27 PROCEDURE — 97116 GAIT TRAINING THERAPY: CPT

## 2018-04-27 PROCEDURE — 85018 HEMOGLOBIN: CPT | Performed by: PHYSICIAN ASSISTANT

## 2018-04-27 PROCEDURE — 74011250637 HC RX REV CODE- 250/637: Performed by: ORTHOPAEDIC SURGERY

## 2018-04-27 PROCEDURE — 77030012935 HC DRSG AQUACEL BMS -B

## 2018-04-27 PROCEDURE — 74011250637 HC RX REV CODE- 250/637: Performed by: PHYSICIAN ASSISTANT

## 2018-04-27 PROCEDURE — 97150 GROUP THERAPEUTIC PROCEDURES: CPT

## 2018-04-27 PROCEDURE — 36415 COLL VENOUS BLD VENIPUNCTURE: CPT | Performed by: PHYSICIAN ASSISTANT

## 2018-04-27 RX ORDER — ONDANSETRON 8 MG/1
8 TABLET, ORALLY DISINTEGRATING ORAL
Status: DISCONTINUED | OUTPATIENT
Start: 2018-04-27 | End: 2018-04-27 | Stop reason: HOSPADM

## 2018-04-27 RX ORDER — ONDANSETRON 4 MG/1
4 TABLET, ORALLY DISINTEGRATING ORAL
Status: DISCONTINUED | OUTPATIENT
Start: 2018-04-27 | End: 2018-04-27 | Stop reason: HOSPADM

## 2018-04-27 RX ORDER — ONDANSETRON 4 MG/1
4 TABLET, ORALLY DISINTEGRATING ORAL
Qty: 30 TAB | Refills: 0 | Status: SHIPPED | OUTPATIENT
Start: 2018-04-27 | End: 2018-08-07

## 2018-04-27 RX ADMIN — ONDANSETRON 8 MG: 8 TABLET, ORALLY DISINTEGRATING ORAL at 08:36

## 2018-04-27 RX ADMIN — HYDROMORPHONE HYDROCHLORIDE 2 MG: 2 TABLET ORAL at 12:48

## 2018-04-27 RX ADMIN — HYDROMORPHONE HYDROCHLORIDE 2 MG: 2 TABLET ORAL at 08:36

## 2018-04-27 RX ADMIN — DIPHENHYDRAMINE HYDROCHLORIDE 25 MG: 25 CAPSULE ORAL at 01:27

## 2018-04-27 RX ADMIN — ONDANSETRON 4 MG: 2 INJECTION INTRAMUSCULAR; INTRAVENOUS at 04:38

## 2018-04-27 RX ADMIN — HYDROMORPHONE HYDROCHLORIDE 1 MG: 2 INJECTION, SOLUTION INTRAMUSCULAR; INTRAVENOUS; SUBCUTANEOUS at 04:38

## 2018-04-27 RX ADMIN — ACETAMINOPHEN 1000 MG: 500 TABLET, FILM COATED ORAL at 08:36

## 2018-04-27 RX ADMIN — ASPIRIN 81 MG: 81 TABLET, COATED ORAL at 08:36

## 2018-04-27 NOTE — PROGRESS NOTES
Dilaudid 1 mg slow IVP for c/o pain. Zofran 4mg given slow IVP for c/o nausea. Family member at bedside. Call light within reach.

## 2018-04-27 NOTE — DISCHARGE INSTRUCTIONS
[facility]   Patient Discharge Instructions    Madina isabela / [id number] : 1970    Admitted 2018 Discharged: 2018     IF YOU HAVE ANY PROBLEMS ONCE YOU ARE AT HOME CALL THE FOLLOWING NUMBERS:   Main office number: [phone]      Medications    · The medications you are to continue on are listed on the medication reconciliation sheet. · Narcotic pain medications as well as supplemental iron can cause constipation. If this occurs try stopping the narcotic pain medication and/or the iron. · It is important that you take the medication exactly as they are prescribed. · Medications which increase your risk of blood clots are listed to stop for 5 weeks after surgery as well as medications or supplements which increase your risk of bleeding complications. · Keep your medication in the bottles provided by the pharmacist and keep a list of the medication names, dosages, and times to be taken in your wallet. · Do not take other medications without consulting your doctor. Important Information    Do NOT smoke as this will greatly increase your risk of infection! Resume your prehospital diet. If you have excessive nausea or vomitting call your doctor's office     Leg swelling and warmth is normal for 6 months after surgery. If you experience swelling in your leg elevate you leg while laying down with your toes above your heart. If you have sudden onset severe swelling with leg pain call our office. The stitches deep inside take approximately 6 months to dissolve. There will be sharp shooting, stinging and burning pain. This is normal and will resolve between 3-6 months after surgery. Difficulty sleeping is normal following total Knee and Hip replacement. You may try melatonin, an over-the-counter sleep aid or benadryl to help with sleep. Most patients will resume sleeping through the night 8 weeks after surgery. Home Physical Therapy is arranged.  Home [facility] will contact you within 48 hrs of discharge that you have chosen. If you have not received a call within this time frame please contact that provider you chose. You should be given this information before you leave the hospital.     You are at a risk for falls. Use the rolling walker when walking. Patients who have had a joint replacement should not drive if they are still taking narcotic pain mediation during the daytime hours. Most patients wean themselves off of pain medication within 2-5 weeks after surgery. When to Call the office    - If you have a temperature greater then 101  - Uncontrolled vomiting   - Loose control of your bladder or bowel function  - Are unable to bear any weight   - Need a pain medication refill       DISCHARGE SUMMARY from Nurse    The following personal items collected during your admission are returned to you:   Dental Appliance: Dental Appliances: None  Vision: Visual Aid: None  Hearing Aid:   na  Jewelry: Jewelry: None  Clothing: Clothing: Pants, Shirt, Footwear  Other Valuables: Other Valuables: Cell Phone, [employer] sent to safe:   na    PATIENT INSTRUCTIONS:    After general anesthesia or intravenous sedation, for 24 hours or while taking prescription Narcotics:  · Limit your activities  · Do not drive and operate hazardous machinery  · Do not make important personal or business decisions  · Do  not drink alcoholic beverages  · If you have not urinated within 8 hours after discharge, please contact your surgeon on call.     Report the following to your surgeon:  · Excessive pain, swelling, redness or odor of or around the surgical area  · Temperature over 101  · Nausea and vomiting lasting longer than 4 hours or if unable to take medications  · Any signs of decreased circulation or nerve impairment to extremity: change in color, persistent  numbness, tingling, coldness or increase pain  · Any questions, call office @ [phone]      Keep scheduled follow up appointment. If need to change, call office @ [phone]. *  Please give a list of your current medications to your Primary Care Provider. *  Please update this list whenever your medications are discontinued, doses are      changed, or new medications (including over-the-counter products) are added. *  Please carry medication information at all times in case of emergency situations. Total Knee Replacement: What to Expect at [address] [place] [address]    When you leave the hospital, you should be able to move around with a walker or crutches. But you will need someone to help you at home for the next few weeks or until you have more energy and can move around better. If there is no one to help you at home, you may go to a rehabilitation center. You will go home with a bandage and stitches or staples. Change the bandage as your doctor tells you to. Your doctor will remove your stitches or staples 10 to 21 days after your surgery. You may still have some mild pain, and the area may be swollen for 3 to 6 months after surgery. Your knee will continue to improve for 6 to 12 months. You will probably use a walker for 1 to 3 weeks and then use crutches. When you are ready, you can use a cane. You will probably be able to walk on your own in 4 to 8 weeks. You will need to do months of physical rehabilitation (rehab) after a knee replacement. Rehab will help you strengthen the muscles of the knee and help you regain movement. After you recover, your artificial knee will allow you to do normal daily activities with less pain or no pain at all. You may be able to hike, dance, ride a bike, and play golf. Talk to your doctor about whether you can do more strenuous activities. Always tell your caregivers that you have an artificial knee. How long it will take to walk on your own, return to normal activities, and go back to work depends on your health and how well your rehabilitation (rehab) program goes.  The better you do with your rehab exercises, the quicker you will get your strength and movement back. This care sheet gives you a general idea about how long it will take for you to recover. But each person recovers at a different pace. Follow the steps below to get better as quickly as possible. How can you care for yourself at home? Activity  ? · Rest when you feel tired. You may take a nap, but do not stay in bed all day. When you sit, use a chair with arms. You can use the arms to help you stand up. ? · Work with your physical therapist to find the best way to exercise. You may be able to take frequent, short walks using crutches or a walker. What you can do as your knee heals will depend on whether your new knee is cemented or uncemented. You may not be able to do certain things for a while if your new knee is uncemented. ? · After your knee has healed enough, you can do more strenuous activities with caution. ¨ You can golf, but use a golf cart, and do not wear shoes with spikes. ¨ You can bike on a flat road or on a stationary bike. Avoid biking up hills. ¨ Your doctor may suggest that you stay away from activities that put stress on your knee. These include tennis or badminton, squash or racquetball, contact sports like football, jumping (such as in basketball), jogging, or running. ¨ Avoid activities where you might fall. These include horseback riding, skiing, and mountain biking. ? · Do not sit for more than 1 hour at a time. Get up and walk around for a while before you sit again. If you must sit for a long time, prop up your leg with a chair or footstool. This will help you avoid swelling. ? · Ask your doctor when you can shower. You may need to take sponge baths until your stitches or staples have been removed. ? · Ask your doctor when you can drive again. It may take up to 8 weeks after knee replacement surgery before it is safe for you to drive.    ? · When you get into a car, sit on the edge of the seat. Then pull in your legs, and turn to face the front. ? · You should be able to do many everyday activities 3 to 6 weeks after your surgery. You will probably need to take 4 to 16 weeks off from work. When you can go back to work depends on the type of work you do and how you feel. ? · Ask your doctor when it is okay for you to have sex. ? · Do not lift anything heavier than 10 pounds and do not lift weights for 12 weeks. Diet  ? · By the time you leave the hospital, you should be eating your normal diet. If your stomach is upset, try bland, low-fat foods like plain rice, broiled chicken, toast, and yogurt. Your doctor may suggest that you take iron and vitamin supplements. ? · Drink plenty of fluids (unless your doctor tells you not to). ? · Eat healthy foods, and watch your portion sizes. Try to stay at your ideal weight. Too much weight puts more stress on your new knee. ? · You may notice that your bowel movements are not regular right after your surgery. This is common. Try to avoid constipation and straining with bowel movements. You may want to take a fiber supplement every day. If you have not had a bowel movement after a couple of days, ask your doctor about taking a mild laxative. Medicines  ? · Your doctor will tell you if and when you can restart your medicines. He or she will also give you instructions about taking any new medicines. ? · If you take blood thinners, such as warfarin (Coumadin), clopidogrel (Plavix), or aspirin, be sure to talk to your doctor. He or she will tell you if and when to start taking those medicines again. Make sure that you understand exactly what your doctor wants you to do.   ? · Your doctor may give you a blood-thinning medicine to prevent blood clots. If you take a blood thinner, be sure you get instructions about how to take your medicine safely. Blood thinners can cause serious bleeding problems.  This medicine could be in pill form or as a shot (injection). If a shot is necessary, your doctor will tell you how to do this. ? · Be safe with medicines. Take pain medicines exactly as directed. ¨ If the doctor gave you a prescription medicine for pain, take it as prescribed. ¨ If you are not taking a prescription pain medicine, ask your doctor if you can take an over-the-counter medicine. ¨ Plan to take your pain medicine 30 minutes before exercises. It is easier to prevent pain before it starts than to stop it once it has started. ? · If you think your pain medicine is making you sick to your stomach:  ¨ Take your medicine after meals (unless your doctor has told you not to). ¨ Ask your doctor for a different pain medicine. ? · If your doctor prescribed antibiotics, take them as directed. Do not stop taking them just because you feel better. You need to take the full course of antibiotics. Incision care  ? · You will have a bandage over the cut (incision) and staples or stitches. Take the bandage off when your doctor says it is okay. ? · Your doctor will remove the staples or stitches 10 days to 3 weeks after the surgery and replace them with strips of tape. Leave the tape on for a week or until it falls off. Exercise  ? · Your rehab program will give you a number of exercises to do to help you get back your knee's range of motion and strength. Always do them as your therapist tells you. Ice and elevation  ? · For pain and swelling, put ice or a cold pack on the area for 10 to 20 minutes at a time. Put a thin cloth between the ice and your skin. Other instructions  ? · Continue to wear your support stockings as your doctor says. These help to prevent blood clots. The length of time that you will have to wear them depends on your activity level and the amount of swelling. ? · You have metal pieces in your knee. These may set off some airport metal detectors.  Carry a medical alert card that says you have an artificial joint, just in case.   Follow-up care is a key part of your treatment and safety. Be sure to make and go to all appointments, and call your doctor if you are having problems. It's also a good idea to know your test results and keep a list of the medicines you take. When should you call for help? Call 911 anytime you think you may need emergency care. For example, call if:  ? · You passed out (lost consciousness). ? · You have severe trouble breathing. ? · You have sudden chest pain and shortness of breath, or you cough up blood. ?Call your doctor now or seek immediate medical care if:  ? · You have signs of infection, such as:  ¨ Increased pain, swelling, warmth, or redness. ¨ Red streaks leading from the incision. ¨ Pus draining from the incision. ¨ A fever. ? · You have signs of a blood clot, such as:  ¨ Pain in your calf, back of the knee, thigh, or groin. ¨ Redness and swelling in your leg or groin. ? · Your incision comes open and begins to bleed, or the bleeding increases. ? · You have pain that does not get better after you take pain medicine. ? Watch closely for changes in your health, and be sure to contact your doctor if:  ? · You do not have a bowel movement after taking a laxative. Where can you learn more? Go to http://ronny-javier.info/. Enter U266 in the search box to learn more about \"Total Knee Replacement: What to Expect at Home. \"  Current as of: March 21, 2017  Content Version: 11.4  © 8657-8908 [employer]. Care instructions adapted under license by [employer] (which disclaims liability or warranty for this information). If you have questions about a medical condition or this instruction, always ask your healthcare professional. [address] any warranty or liability for your use of this information.         These are general instructions for a healthy lifestyle:    No smoking/ No tobacco products/ Avoid exposure to second hand smoke    Surgeon General's Warning:  Quitting smoking now greatly reduces serious risk to your health. Obesity, smoking, and sedentary lifestyle greatly increases your risk for illness    A healthy diet, regular physical exercise & weight monitoring are important for maintaining a healthy lifestyle    You may be retaining fluid if you have a history of heart failure or if you experience any of the following symptoms:  Weight gain of 3 pounds or more overnight or 5 pounds in a week, increased swelling in our hands or feet or shortness of breath while lying flat in bed. Please call your doctor as soon as you notice any of these symptoms; do not wait until your next office visit. Recognize signs and symptoms of STROKE:    F-face looks uneven    A-arms unable to move or move even    S-speech slurred or non-existent    T-time-call 911 as soon as signs and symptoms begin-DO NOT go       Back to bed or wait to see if you get better-TIME IS BRAIN. The discharge information has been reviewed with the patient. The patient verbalized understanding. Information obtained by :  I understand that if any problems occur once I am at home I am to contact my physician. I understand and acknowledge receipt of the instructions indicated above.                                                                                                                                            Physician's or R.N.'s Signature                                                                  Date/Time                                                                                                                                              Patient or Representative Signature                                                          Date/Time

## 2018-04-27 NOTE — PROGRESS NOTES
2018         Post Op day: 2 Days Post-OpProcedure(s) (LRB):  KNEE ARTHROPLASTY TOTAL/ LEFT/ DYANA/ FNB /ANCEF 2gm / VANCO ([place] - 2018) (Left)      Admit Date: 2018  Admit Diagnosis: Primary osteoarthritis of left knee [M17.12]    LAB:    Recent Results (from the past 24 hour(s))   PLEASE READ & DOCUMENT PPD TEST IN 24 HRS    Collection Time: 18  8:55 AM   Result Value Ref Range    PPD  Negative    mm Induration 0 mm   HEMOGLOBIN    Collection Time: 18  4:23 AM   Result Value Ref Range    HGB 11.0 (L) 11.7 - 15.4 g/dL     Vital Signs:    Patient Vitals for the past 8 hrs:   BP Temp Pulse Resp SpO2   18 0515 129/63 97.7 °F (36.5 °C) 66 18 90 %   18 0114 101/65 98.3 °F (36.8 °C) 81 18 94 %     Temp (24hrs), Av.8 °F (36.6 °C), Min:97.6 °F (36.4 °C), Max:98.3 °F (36.8 °C)    Body mass index is 24.69 kg/(m^2).   Pain Control:   Pain Assessment  Pain Scale 1: FACES  Pain Intensity 1: 0  Pain Onset 1:  (IV to R hand infiltrated in OR)  Pain Location 1: Knee  Pain Orientation 1: Left  Pain Description 1: Aching  Pain Intervention(s) 1: Medication (see MAR)    Subjective: Doing well, No complaints, No SOB, No Chest Pain, positive Nausea and Vomitting     Objective: Vital Signs are Stable, No Acute Distress, Alert and Oriented, Dressing is Dry,  Neurovascular exam is normal.       PT/OT:            Assistive Device: Walker (comment)        LLE AROM  L Knee Flexion: 70  L Knee Extension: -8       Weight Bearing Status: WBAT    Meds:  [unfilled]  [unfilled]  [unfilled]    Assessment:   Patient Active Problem List   Diagnosis Code    Eczema L30.9    Osteopenia M85.80    Ulcerative colitis, chronic (HCC) K51.90    OA (osteoarthritis) of knee M17.10    Health care maintenance Z00.00    Lipoma of left thigh D17.24    Vitamin D deficiency E55.9    Snoring R06.83    Status post total left knee replacement Z96.652             Plan: Continue Physical Therapy, Monitor  Hbg, home if nausea and vomiting resolve        Signed By: Hellen Patel., MD

## 2018-04-27 NOTE — PROGRESS NOTES
Problem: Mobility Impaired (Adult and Pediatric)  Goal: *Acute Goals and Plan of Care (Insert Text)  GOALS (1-4 days):  (1.)Ms. Danitza Welch will move from supine to sit and sit to supine  in bed with INDEPENDENT. (2.)Ms. Danitza Welch will transfer from bed to chair and chair to bed with INDEPENDENT using the least restrictive device. (3.)Ms. Danitza Welch will ambulate with INDEPENDENT for 250 feet with the least restrictive device. (4.)Ms. Danitza Welch will ambulate up/down 3 steps with bilateral  railing with MINIMAL ASSIST with no device. Goal met 4/27/18. (5.)Ms. Danitza Welch will increase left knee ROM to 5°-80°.  ________________________________________________________________________________________________    Outcome: Progressing Towards Goal    PHYSICAL THERAPY [facility]: Daily Note, Treatment Day: 2nd, AM 4/27/2018  INPATIENT: Hospital Day: 3  Payor: SARAY / Plan: Watson Dobbins [address] / Product Type: Christianne Leventhal /      NAME/AGE/GENDER: Shant Villegas is a 50 y.o. female   PRIMARY DIAGNOSIS:  Primary osteoarthritis of left knee [M17.12]   Procedure(s) and Anesthesia Type:     * KNEE ARTHROPLASTY TOTAL/ LEFT/ DYANA/ FNB /ANCEF 2gm / VANCO ([place] - 4/24/2018) - Spinal (Left)  ICD-10: Treatment Diagnosis:    · Pain in Left Knee (M25.562)  · Stiffness of Left Knee, Not elsewhere classified (M25.662)  · Difficulty in walking, Not elsewhere classified (R26.2)  · Other abnormalities of gait and mobility (R26.89)      ASSESSMENT:     Ms. Danitza Welch presents status post left total knee replacement with decreased independence with functional mobility. Pt demonstrated increased gait distance this morning and was able to increase LE exercise repetitions. Instructed in stair climbing. L LE swelling continues to limit L knee flexion. Patient was encouraged to focus on elevation, ice and knee flexion at home to avoid possibility of manipulation.      This section established at most recent assessment   PROBLEM LIST (Impairments causing functional limitations):  1. Decreased Strength  2. Decreased Transfer Abilities  3. Decreased Ambulation Ability/Technique  4. Decreased Balance  5. Increased Pain  6. Decreased Activity Tolerance  7. Decreased Flexibility/Joint Mobility   INTERVENTIONS PLANNED: (Benefits and precautions of physical therapy have been discussed with the patient.)  1. Bed Mobility  2. Gait Training  3. Home Exercise Program (HEP)  4. Therapeutic Exercise/Strengthening  5. Transfer Training  6. Range of Motion: active/assisted/passive  7. Therapeutic Activities  8. Group Therapy     TREATMENT PLAN: Frequency/Duration: Follow patient BID for duration of hospital stay to address above goals. Rehabilitation Potential For Stated Goals: Good     RECOMMENDED REHABILITATION/EQUIPMENT: (at time of discharge pending progress): Home Health: Physical Therapy. HISTORY:   History of Present Injury/Illness (Reason for Referral):  Pt is status post left total knee replacement. Past Medical History/Comorbidities:   Ms. Alison Herron  has a past medical history of Eczema; Herpes simplex; OA (osteoarthritis) of knee; Osteopenia; Status post total left knee replacement (4/25/2018); Ulcerative colitis, chronic ([facility] [address] and Vitamin D deficiency. Ms. Alison Herron  has a past surgical history that includes hx orthopaedic (as a teenager); pr abdomen surgery proc unlisted; and hx refractive surgery. Social History/Living Environment:   Home Environment: Private residence  # Steps to Enter: 4  Rails to Enter: Yes  Hand Rails : Right  One/Two Story Residence: One story  Living Alone: No  Support Systems: Spouse/Significant Other/Partner  Patient Expects to be Discharged to[de-identified] Private residence  Current DME Used/Available at Home: None  Tub or Shower Type: Tub/Shower combination  Prior Level of Function/Work/Activity:  Independent with ambulation.    Number of Personal Factors/Comorbidities that affect the Plan of Care: 0: LOW COMPLEXITY   EXAMINATION:   Most Recent Physical Functioning:                 LLE AROM  L Knee Flexion: 58  L Knee Extension: -6          Bed Mobility  Supine to Sit: Stand-by assistance  Sit to Supine: Stand-by assistance    Transfers  Sit to Stand: Stand-by assistance  Stand to Sit: Stand-by assistance  Bed to Chair: Stand-by assistance    Balance  Sitting: Intact  Standing: With support              Weight Bearing Status  Left Side Weight Bearing: As tolerated  Distance (ft): 175 Feet (ft)  Ambulation - Level of Assistance: Stand-by assistance  Assistive Device: Walker, rolling  Speed/Cyndie: Slow  Stance: Left decreased  Gait Abnormalities: Antalgic  Number of Stairs Trained: 3  Stairs - Level of Assistance: Contact guard assistance  Rail Use: Both  Interventions: Manual cues; Safety awareness training;Verbal cues     Braces/Orthotics: none    Left Knee Cold  Type: Cryocuff      Body Structures Involved:  1. Bones  2. Joints  3. Muscles  4. Ligaments Body Functions Affected:  1. Neuromusculoskeletal  2. Movement Related Activities and Participation Affected:  1. Mobility   Number of elements that affect the Plan of Care: 4+: HIGH COMPLEXITY   CLINICAL PRESENTATION:   Presentation: Stable and uncomplicated: LOW COMPLEXITY   CLINICAL DECISION MAKIN[address] [place] [address] AM-PAC 6 Clicks   Basic Mobility Inpatient Short Form  How much difficulty does the patient currently have. .. Unable A Lot A Little None   1. Turning over in bed (including adjusting bedclothes, sheets and blankets)? [] 1   [] 2   [x] 3   [] 4   2. Sitting down on and standing up from a chair with arms ( e.g., wheelchair, bedside commode, etc.)   [] 1   [] 2   [x] 3   [] 4   3. Moving from lying on back to sitting on the side of the bed? [] 1   [] 2   [x] 3   [] 4   How much help from another person does the patient currently need. .. Total A Lot A Little None   4. Moving to and from a bed to a chair (including a wheelchair)? [] 1   [] 2   [x] 3   [] 4   5.   Need to walk in hospital room? [] 1   [] 2   [x] 3   [] 4   6. Climbing 3-5 steps with a railing? [] 1   [] 2   [x] 3   [] 4   © 2007, Trustees of [address] [place] [address] [address] [address], under license to [employer]. All rights reserved        Score:  Initial: 18 Most Recent: X (Date: -- )    Interpretation of Tool:  Represents activities that are increasingly more difficult (i.e. Bed mobility, Transfers, Gait). Score 24 23 22-20 19-15 14-10 9-7 6     Modifier CH CI CJ CK CL CM CN      ? Mobility - Walking and Moving Around:     - CURRENT STATUS: CK - 40%-59% impaired, limited or restricted    - GOAL STATUS: CK - 40%-59% impaired, limited or restricted    - D/C STATUS:  CK - 40%-59% impaired, limited or restricted  Payor:  / Plan: [employer] / Product Type:  /      Medical Necessity:     · Skilled intervention continues to be required due to decreased mobility ability. Reason for Services/Other Comments:  · Patient continues to require skilled intervention due to decreased mobility ability. Use of outcome tool(s) and clinical judgement create a POC that gives a: Clear prediction of patient's progress: LOW COMPLEXITY            TREATMENT:   (In addition to Assessment/Re-Assessment sessions the following treatments were rendered)     Pre-treatment Symptoms/Complaints:  L knee pain  Pain: Initial:   Pain Intensity 1: 4  Pain Location 1: Knee  Pain Orientation 1: Left  Pain Intervention(s) 1: Cold pack, Repositioned  Post Session:  4/10 pain     Gait Training (15 Minutes):  Gait training to improve and/or restore physical functioning as related to dynamic movement of knee - left to improve functional gait pattern. Ambulated 175 Feet (ft) with Stand-by assistance using a Walker, rolling and minimal Manual cues; Safety awareness training;Verbal cues related to their knee position and motion to promote proper body mechanics.     Therapeutic Exercise: (30 Minutes (group)):  Exercises per grid below to improve mobility and strength. Required minimal verbal and manual cues to perform exercises correctly. Progressed range and repetitions as indicated. Date:  4/26/18 Date:  4/27/18 Date:     ACTIVITY/EXERCISE AM PM AM PM AM PM   GROUP THERAPY  []  [x]  [x]  []  []  []   Ankle Pumps 10 15 20      Quad Sets 10 115 20      Gluteal Sets 10 15 20      Hip ABd/ADduction 10 15 20      Straight Leg Raises 10 15 20      Knee Slides 10 15 20      Short Arc Quads  15 20      Long Arc Quads         Chair Slides  15 20               B = bilateral; AA = active assistive; A = active; P = passive      Treatment/Session Assessment:     Response to Treatment:  Pt tolerated well. Education:  [x] Home Exercises  [x] Fall Precautions  [] Hip Precautions [x] D/C Instruction Review  [x] Knee/Hip Prosthesis Review  [x] Walker Management/Safety [] Adaptive Equipment as Needed       Interdisciplinary Collaboration:   o Physical Therapist  o Registered Nurse    After treatment position/precautions:   o Supine in bed  o Bed/Chair-wheels locked  o Bed in low position  o Call light within reach  o Family at bedside    Compliance with Program/Exercises: compliant most of the time. Recommendations/Intent for next treatment session:  Treatment next visit will focus on increasing Ms. Lagunas's independence with bed mobility, transfers, gait training, strength/ROM exercises, modalities for pain, and patient education.       Total Treatment Duration:  PT Patient Time In/Time Out  Time In: 1030  Time Out: [id number], PT

## 2018-04-27 NOTE — PROGRESS NOTES
Had group therapy. Has equipment. Given prescriptions for dilaudid, aspirin, and zofran and instructed how to take. Aquacel remains dry and intact. Has follow up appt already with Dr Eric Damon. Home health to see pt for therapy. Instructed to call doctor if having fever, excessive drainage, numbness or other problems. Instructed to use walker. I have reviewed discharge instructions with the patient and mother. The patient and mother verbalized understanding. Going to car via wheelchair. Discharged home with family.

## 2018-04-27 NOTE — PROGRESS NOTES
In recliner.  in room. Medicated for pain with dilaudid po and to prevent nausea with zofran po. NV checks WDL. L knee with aquacel intact and dry. Iceman to knee.

## 2018-04-28 ENCOUNTER — HOME CARE VISIT (OUTPATIENT)
Dept: SCHEDULING | Facility: HOME HEALTH | Age: 48
End: 2018-04-28
Payer: OTHER GOVERNMENT

## 2018-04-28 VITALS
TEMPERATURE: 97.4 F | SYSTOLIC BLOOD PRESSURE: 134 MMHG | HEART RATE: 120 BPM | RESPIRATION RATE: 17 BRPM | DIASTOLIC BLOOD PRESSURE: 78 MMHG

## 2018-04-28 PROCEDURE — G0151 HHCP-SERV OF PT,EA 15 MIN: HCPCS

## 2018-04-28 PROCEDURE — 3331090001 HH PPS REVENUE CREDIT

## 2018-04-28 PROCEDURE — 400013 HH SOC

## 2018-04-28 PROCEDURE — 3331090002 HH PPS REVENUE DEBIT

## 2018-04-29 PROCEDURE — 3331090001 HH PPS REVENUE CREDIT

## 2018-04-29 PROCEDURE — 3331090002 HH PPS REVENUE DEBIT

## 2018-04-30 ENCOUNTER — HOME CARE VISIT (OUTPATIENT)
Dept: SCHEDULING | Facility: HOME HEALTH | Age: 48
End: 2018-04-30
Payer: OTHER GOVERNMENT

## 2018-04-30 PROCEDURE — 3331090001 HH PPS REVENUE CREDIT

## 2018-04-30 PROCEDURE — G0157 HHC PT ASSISTANT EA 15: HCPCS

## 2018-04-30 PROCEDURE — 3331090002 HH PPS REVENUE DEBIT

## 2018-05-01 VITALS
HEART RATE: 86 BPM | SYSTOLIC BLOOD PRESSURE: 132 MMHG | DIASTOLIC BLOOD PRESSURE: 78 MMHG | TEMPERATURE: 98.6 F | RESPIRATION RATE: 18 BRPM

## 2018-05-01 PROCEDURE — 3331090001 HH PPS REVENUE CREDIT

## 2018-05-01 PROCEDURE — 3331090002 HH PPS REVENUE DEBIT

## 2018-05-02 ENCOUNTER — HOME CARE VISIT (OUTPATIENT)
Dept: SCHEDULING | Facility: HOME HEALTH | Age: 48
End: 2018-05-02
Payer: OTHER GOVERNMENT

## 2018-05-02 LAB
BACTERIA SPEC CULT: NORMAL
SERVICE CMNT-IMP: NORMAL

## 2018-05-02 PROCEDURE — G0157 HHC PT ASSISTANT EA 15: HCPCS

## 2018-05-02 PROCEDURE — 3331090001 HH PPS REVENUE CREDIT

## 2018-05-02 PROCEDURE — 3331090002 HH PPS REVENUE DEBIT

## 2018-05-03 VITALS
RESPIRATION RATE: 18 BRPM | SYSTOLIC BLOOD PRESSURE: 128 MMHG | TEMPERATURE: 98.4 F | HEART RATE: 84 BPM | DIASTOLIC BLOOD PRESSURE: 80 MMHG

## 2018-05-03 PROCEDURE — 3331090002 HH PPS REVENUE DEBIT

## 2018-05-03 PROCEDURE — 3331090001 HH PPS REVENUE CREDIT

## 2018-05-04 ENCOUNTER — HOME CARE VISIT (OUTPATIENT)
Dept: SCHEDULING | Facility: HOME HEALTH | Age: 48
End: 2018-05-04
Payer: OTHER GOVERNMENT

## 2018-05-04 PROCEDURE — 3331090001 HH PPS REVENUE CREDIT

## 2018-05-04 PROCEDURE — 3331090002 HH PPS REVENUE DEBIT

## 2018-05-04 PROCEDURE — G0157 HHC PT ASSISTANT EA 15: HCPCS

## 2018-05-05 PROCEDURE — 3331090002 HH PPS REVENUE DEBIT

## 2018-05-05 PROCEDURE — 3331090001 HH PPS REVENUE CREDIT

## 2018-05-06 VITALS
RESPIRATION RATE: 18 BRPM | TEMPERATURE: 98.4 F | SYSTOLIC BLOOD PRESSURE: 122 MMHG | DIASTOLIC BLOOD PRESSURE: 80 MMHG | HEART RATE: 78 BPM

## 2018-05-06 PROCEDURE — 3331090002 HH PPS REVENUE DEBIT

## 2018-05-06 PROCEDURE — 3331090001 HH PPS REVENUE CREDIT

## 2018-05-07 ENCOUNTER — HOME CARE VISIT (OUTPATIENT)
Dept: SCHEDULING | Facility: HOME HEALTH | Age: 48
End: 2018-05-07
Payer: OTHER GOVERNMENT

## 2018-05-07 VITALS
TEMPERATURE: 97.9 F | SYSTOLIC BLOOD PRESSURE: 130 MMHG | HEART RATE: 78 BPM | DIASTOLIC BLOOD PRESSURE: 80 MMHG | RESPIRATION RATE: 18 BRPM

## 2018-05-07 PROCEDURE — 3331090002 HH PPS REVENUE DEBIT

## 2018-05-07 PROCEDURE — G0157 HHC PT ASSISTANT EA 15: HCPCS

## 2018-05-07 PROCEDURE — 3331090001 HH PPS REVENUE CREDIT

## 2018-05-08 PROCEDURE — 3331090001 HH PPS REVENUE CREDIT

## 2018-05-08 PROCEDURE — 3331090002 HH PPS REVENUE DEBIT

## 2018-05-09 ENCOUNTER — HOME CARE VISIT (OUTPATIENT)
Dept: SCHEDULING | Facility: HOME HEALTH | Age: 48
End: 2018-05-09
Payer: OTHER GOVERNMENT

## 2018-05-09 PROCEDURE — 3331090001 HH PPS REVENUE CREDIT

## 2018-05-09 PROCEDURE — G0157 HHC PT ASSISTANT EA 15: HCPCS

## 2018-05-09 PROCEDURE — 3331090002 HH PPS REVENUE DEBIT

## 2018-05-10 VITALS
TEMPERATURE: 98 F | HEART RATE: 78 BPM | DIASTOLIC BLOOD PRESSURE: 78 MMHG | RESPIRATION RATE: 18 BRPM | SYSTOLIC BLOOD PRESSURE: 118 MMHG

## 2018-05-10 PROCEDURE — 3331090001 HH PPS REVENUE CREDIT

## 2018-05-10 PROCEDURE — 3331090002 HH PPS REVENUE DEBIT

## 2018-05-11 ENCOUNTER — HOME CARE VISIT (OUTPATIENT)
Dept: SCHEDULING | Facility: HOME HEALTH | Age: 48
End: 2018-05-11
Payer: OTHER GOVERNMENT

## 2018-05-11 PROCEDURE — 3331090002 HH PPS REVENUE DEBIT

## 2018-05-11 PROCEDURE — G0157 HHC PT ASSISTANT EA 15: HCPCS

## 2018-05-11 PROCEDURE — 3331090001 HH PPS REVENUE CREDIT

## 2018-05-12 PROCEDURE — 3331090001 HH PPS REVENUE CREDIT

## 2018-05-12 PROCEDURE — 3331090002 HH PPS REVENUE DEBIT

## 2018-05-13 VITALS
HEART RATE: 88 BPM | RESPIRATION RATE: 18 BRPM | SYSTOLIC BLOOD PRESSURE: 120 MMHG | DIASTOLIC BLOOD PRESSURE: 80 MMHG | TEMPERATURE: 98.3 F

## 2018-05-13 PROCEDURE — 3331090002 HH PPS REVENUE DEBIT

## 2018-05-13 PROCEDURE — 3331090001 HH PPS REVENUE CREDIT

## 2018-05-14 PROCEDURE — 3331090001 HH PPS REVENUE CREDIT

## 2018-05-14 PROCEDURE — 3331090002 HH PPS REVENUE DEBIT

## 2018-05-15 PROCEDURE — A4649 SURGICAL SUPPLIES: HCPCS

## 2018-05-15 PROCEDURE — 3331090001 HH PPS REVENUE CREDIT

## 2018-05-15 PROCEDURE — 3331090002 HH PPS REVENUE DEBIT

## 2018-05-15 PROCEDURE — A6199 ALGINATE DRSG WOUND FILLER: HCPCS

## 2018-05-16 ENCOUNTER — HOME CARE VISIT (OUTPATIENT)
Dept: SCHEDULING | Facility: HOME HEALTH | Age: 48
End: 2018-05-16
Payer: OTHER GOVERNMENT

## 2018-05-16 PROCEDURE — 3331090002 HH PPS REVENUE DEBIT

## 2018-05-16 PROCEDURE — 3331090001 HH PPS REVENUE CREDIT

## 2018-05-17 PROCEDURE — 3331090002 HH PPS REVENUE DEBIT

## 2018-05-17 PROCEDURE — 3331090001 HH PPS REVENUE CREDIT

## 2018-08-10 ENCOUNTER — HOSPITAL ENCOUNTER (OUTPATIENT)
Dept: MAMMOGRAPHY | Age: 48
Discharge: HOME | End: 2018-08-10
Attending: FAMILY MEDICINE
Payer: OTHER GOVERNMENT

## 2018-08-10 DIAGNOSIS — N63.20 LEFT BREAST MASS: ICD-10-CM

## 2018-08-10 PROCEDURE — 76642 ULTRASOUND BREAST LIMITED: CPT

## 2018-08-10 PROCEDURE — 77065 DX MAMMO INCL CAD UNI: CPT

## 2018-11-08 PROBLEM — N30.10 IC (INTERSTITIAL CYSTITIS): Status: ACTIVE | Noted: 2018-11-08

## 2019-03-13 ENCOUNTER — HOSPITAL ENCOUNTER (OUTPATIENT)
Dept: MAMMOGRAPHY | Age: 49
Discharge: HOME | End: 2019-03-13
Attending: FAMILY MEDICINE

## 2019-03-13 DIAGNOSIS — Z12.31 VISIT FOR SCREENING MAMMOGRAM: ICD-10-CM

## 2020-07-29 ENCOUNTER — HOSPITAL ENCOUNTER (OUTPATIENT)
Dept: MAMMOGRAPHY | Age: 50
Discharge: HOME | End: 2020-07-29
Attending: FAMILY MEDICINE
Payer: OTHER GOVERNMENT

## 2020-07-29 DIAGNOSIS — Z12.31 SCREENING MAMMOGRAM, ENCOUNTER FOR: ICD-10-CM

## 2020-07-29 PROCEDURE — 77063 BREAST TOMOSYNTHESIS BI: CPT

## 2020-12-14 PROBLEM — L65.9 HAIR LOSS: Status: ACTIVE | Noted: 2019-07-15

## 2020-12-14 PROBLEM — D21.9 FIBROIDS: Status: ACTIVE | Noted: 2019-07-15

## 2020-12-14 PROBLEM — N95.1 MENOPAUSAL SYMPTOMS: Status: ACTIVE | Noted: 2019-07-15

## 2020-12-14 PROBLEM — N83.201 RIGHT OVARIAN CYST: Status: ACTIVE | Noted: 2019-07-15

## 2020-12-14 PROBLEM — G47.9 DIFFICULTY SLEEPING: Status: ACTIVE | Noted: 2019-07-15

## 2021-07-16 ENCOUNTER — TRANSCRIBE ORDER (OUTPATIENT)
Dept: SCHEDULING | Age: 51
End: 2021-07-16

## 2021-07-16 DIAGNOSIS — Z12.31 ENCOUNTER FOR SCREENING MAMMOGRAM FOR MALIGNANT NEOPLASM OF BREAST: Primary | ICD-10-CM

## 2021-07-20 PROBLEM — L65.0 TELOGEN EFFLUVIUM: Status: ACTIVE | Noted: 2021-07-20

## 2021-07-20 PROBLEM — M54.50 LOW BACK PAIN: Status: ACTIVE | Noted: 2021-07-20

## 2021-07-20 PROBLEM — M72.2 BILATERAL PLANTAR FASCIITIS: Status: ACTIVE | Noted: 2021-07-20

## 2021-07-20 PROBLEM — R76.8 ELEVATED ANTINUCLEAR ANTIBODY (ANA) LEVEL: Status: ACTIVE | Noted: 2021-07-20

## 2021-07-20 PROBLEM — L64.9 ANDROGENETIC ALOPECIA: Status: ACTIVE | Noted: 2021-07-20

## 2021-07-30 ENCOUNTER — HOSPITAL ENCOUNTER (OUTPATIENT)
Dept: MAMMOGRAPHY | Age: 51
Discharge: HOME | End: 2021-07-30
Attending: FAMILY MEDICINE
Payer: OTHER GOVERNMENT

## 2021-07-30 DIAGNOSIS — Z12.31 ENCOUNTER FOR SCREENING MAMMOGRAM FOR MALIGNANT NEOPLASM OF BREAST: ICD-10-CM

## 2021-07-30 PROCEDURE — 77063 BREAST TOMOSYNTHESIS BI: CPT

## 2021-08-04 NOTE — PROGRESS NOTES
Your mammogram is normal.  Make sure you get your mammogram done yearly.   You can call and schedule your appointment for your mammogram.

## 2022-03-18 PROBLEM — M54.50 LOW BACK PAIN: Status: ACTIVE | Noted: 2021-07-20

## 2022-03-18 PROBLEM — L65.9 HAIR LOSS: Status: ACTIVE | Noted: 2019-07-15

## 2022-03-18 PROBLEM — R76.8 ELEVATED ANTINUCLEAR ANTIBODY (ANA) LEVEL: Status: ACTIVE | Noted: 2021-07-20

## 2022-03-19 PROBLEM — Z96.652 STATUS POST TOTAL LEFT KNEE REPLACEMENT: Status: ACTIVE | Noted: 2018-04-25

## 2022-03-19 PROBLEM — N30.10 IC (INTERSTITIAL CYSTITIS): Status: ACTIVE | Noted: 2018-11-08

## 2022-03-19 PROBLEM — R06.83 SNORING: Status: ACTIVE | Noted: 2018-04-10

## 2022-03-19 PROBLEM — N95.1 MENOPAUSAL SYMPTOMS: Status: ACTIVE | Noted: 2019-07-15

## 2022-03-19 PROBLEM — G47.9 DIFFICULTY SLEEPING: Status: ACTIVE | Noted: 2019-07-15

## 2022-03-19 PROBLEM — E55.9 VITAMIN D DEFICIENCY: Status: ACTIVE | Noted: 2017-08-23

## 2022-03-20 PROBLEM — N83.201 RIGHT OVARIAN CYST: Status: ACTIVE | Noted: 2019-07-15

## 2022-03-20 PROBLEM — M72.2 BILATERAL PLANTAR FASCIITIS: Status: ACTIVE | Noted: 2021-07-20

## 2022-03-20 PROBLEM — L65.0 TELOGEN EFFLUVIUM: Status: ACTIVE | Noted: 2021-07-20

## 2022-03-20 PROBLEM — L64.9 ANDROGENETIC ALOPECIA: Status: ACTIVE | Noted: 2021-07-20

## 2022-03-20 PROBLEM — D21.9 FIBROIDS: Status: ACTIVE | Noted: 2019-07-15

## 2022-08-19 ENCOUNTER — TELEPHONE (OUTPATIENT)
Dept: FAMILY MEDICINE CLINIC | Facility: CLINIC | Age: 52
End: 2022-08-19

## 2022-08-19 RX ORDER — AMOXICILLIN 500 MG/1
500 CAPSULE ORAL 2 TIMES DAILY
Qty: 10 CAPSULE | Refills: 0 | Status: SHIPPED | OUTPATIENT
Start: 2022-08-19 | End: 2022-08-24

## 2022-08-19 NOTE — TELEPHONE ENCOUNTER
Prescription(s) refilled  It (they) has been escribed to the pharmacy. Requested Prescriptions     Signed Prescriptions Disp Refills    amoxicillin (AMOXIL) 500 MG capsule 10 capsule 0     Sig: Take 1 capsule by mouth 2 times daily for 5 days     Authorizing Provider: EVELIO VICKERS     No orders of the defined types were placed in this encounter. No diagnosis found.

## 2022-08-19 NOTE — TELEPHONE ENCOUNTER
Patient has possible UTI patient states Dr has called something in for this before as per Michaelle I tried calling patient back to inform her she needed to go to ER with no answer if possible please send in to [facility].

## 2022-09-21 ENCOUNTER — TELEPHONE (OUTPATIENT)
Dept: FAMILY MEDICINE CLINIC | Facility: CLINIC | Age: 52
End: 2022-09-21

## 2022-09-21 NOTE — TELEPHONE ENCOUNTER
Pt called states that she needs a copy of her recent mammogram done at [facility].  Report printed and left message for the patient this would be placed up front for pickup

## 2022-10-05 ENCOUNTER — OFFICE VISIT (OUTPATIENT)
Dept: FAMILY MEDICINE CLINIC | Facility: CLINIC | Age: 52
End: 2022-10-05
Payer: OTHER GOVERNMENT

## 2022-10-05 ENCOUNTER — NURSE ONLY (OUTPATIENT)
Dept: FAMILY MEDICINE CLINIC | Facility: CLINIC | Age: 52
End: 2022-10-05

## 2022-10-05 VITALS
OXYGEN SATURATION: 97 % | HEART RATE: 70 BPM | SYSTOLIC BLOOD PRESSURE: 127 MMHG | RESPIRATION RATE: 16 BRPM | TEMPERATURE: 98.2 F | WEIGHT: 150.2 LBS | HEIGHT: 66 IN | DIASTOLIC BLOOD PRESSURE: 72 MMHG | BODY MASS INDEX: 24.14 KG/M2

## 2022-10-05 DIAGNOSIS — E55.9 VITAMIN D DEFICIENCY: ICD-10-CM

## 2022-10-05 DIAGNOSIS — M25.552 LEFT HIP PAIN: ICD-10-CM

## 2022-10-05 DIAGNOSIS — Z79.899 ENCOUNTER FOR LONG-TERM (CURRENT) USE OF MEDICATIONS: ICD-10-CM

## 2022-10-05 DIAGNOSIS — M54.42 ACUTE BILATERAL LOW BACK PAIN WITH BILATERAL SCIATICA: ICD-10-CM

## 2022-10-05 DIAGNOSIS — R76.8 ELEVATED ANTINUCLEAR ANTIBODY (ANA) LEVEL: ICD-10-CM

## 2022-10-05 DIAGNOSIS — G47.9 DIFFICULTY SLEEPING: ICD-10-CM

## 2022-10-05 DIAGNOSIS — M25.50 ARTHRALGIA, UNSPECIFIED JOINT: ICD-10-CM

## 2022-10-05 DIAGNOSIS — B96.89 ACUTE BACTERIAL SINUSITIS: ICD-10-CM

## 2022-10-05 DIAGNOSIS — M54.41 ACUTE BILATERAL LOW BACK PAIN WITH BILATERAL SCIATICA: ICD-10-CM

## 2022-10-05 DIAGNOSIS — Z13.220 SCREENING FOR LIPID DISORDERS: ICD-10-CM

## 2022-10-05 DIAGNOSIS — F41.1 GAD (GENERALIZED ANXIETY DISORDER): ICD-10-CM

## 2022-10-05 DIAGNOSIS — R63.5 WEIGHT GAIN: ICD-10-CM

## 2022-10-05 DIAGNOSIS — R13.19 ESOPHAGEAL DYSPHAGIA: Primary | ICD-10-CM

## 2022-10-05 DIAGNOSIS — J01.90 ACUTE BACTERIAL SINUSITIS: ICD-10-CM

## 2022-10-05 DIAGNOSIS — M79.18 LEFT BUTTOCK PAIN: ICD-10-CM

## 2022-10-05 DIAGNOSIS — N95.1 MENOPAUSAL SYMPTOMS: ICD-10-CM

## 2022-10-05 LAB
25(OH)D3 SERPL-MCNC: 30.6 NG/ML (ref 30–100)
ALBUMIN SERPL-MCNC: 3.9 G/DL (ref 3.5–5)
ALBUMIN/GLOB SERPL: 1.3 {RATIO} (ref 1.2–3.5)
ALP SERPL-CCNC: 62 U/L (ref 50–136)
ALT SERPL-CCNC: 24 U/L (ref 12–65)
ANION GAP SERPL CALC-SCNC: 3 MMOL/L (ref 4–13)
AST SERPL-CCNC: 10 U/L (ref 15–37)
BASOPHILS # BLD: 0.1 K/UL (ref 0–0.2)
BASOPHILS NFR BLD: 1 % (ref 0–2)
BILIRUB SERPL-MCNC: 0.3 MG/DL (ref 0.2–1.1)
BUN SERPL-MCNC: 14 MG/DL (ref 6–23)
CALCIUM SERPL-MCNC: 9 MG/DL (ref 8.3–10.4)
CHLORIDE SERPL-SCNC: 109 MMOL/L (ref 101–110)
CHOLEST SERPL-MCNC: 176 MG/DL
CO2 SERPL-SCNC: 28 MMOL/L (ref 21–32)
CREAT SERPL-MCNC: 0.8 MG/DL (ref 0.6–1)
CRP SERPL-MCNC: 0.7 MG/DL (ref 0–0.9)
DIFFERENTIAL METHOD BLD: NORMAL
EOSINOPHIL # BLD: 0.1 K/UL (ref 0–0.8)
EOSINOPHIL NFR BLD: 2 % (ref 0.5–7.8)
ERYTHROCYTE [DISTWIDTH] IN BLOOD BY AUTOMATED COUNT: 13.3 % (ref 11.9–14.6)
GLOBULIN SER CALC-MCNC: 3 G/DL (ref 2.3–3.5)
GLUCOSE SERPL-MCNC: 84 MG/DL (ref 65–100)
HCT VFR BLD AUTO: 44.9 % (ref 35.8–46.3)
HDLC SERPL-MCNC: 61 MG/DL (ref 40–60)
HDLC SERPL: 2.9 {RATIO}
HGB BLD-MCNC: 14.6 G/DL (ref 11.7–15.4)
IMM GRANULOCYTES # BLD AUTO: 0 K/UL (ref 0–0.5)
IMM GRANULOCYTES NFR BLD AUTO: 0 % (ref 0–5)
LDLC SERPL CALC-MCNC: 83.8 MG/DL
LYMPHOCYTES # BLD: 1.7 K/UL (ref 0.5–4.6)
LYMPHOCYTES NFR BLD: 23 % (ref 13–44)
MAGNESIUM SERPL-MCNC: 2.3 MG/DL (ref 1.8–2.4)
MCH RBC QN AUTO: 29.2 PG (ref 26.1–32.9)
MCHC RBC AUTO-ENTMCNC: 32.5 G/DL (ref 31.4–35)
MCV RBC AUTO: 89.8 FL (ref 79.6–97.8)
MONOCYTES # BLD: 0.8 K/UL (ref 0.1–1.3)
MONOCYTES NFR BLD: 11 % (ref 4–12)
NEUTS SEG # BLD: 4.7 K/UL (ref 1.7–8.2)
NEUTS SEG NFR BLD: 63 % (ref 43–78)
NRBC # BLD: 0 K/UL (ref 0–0.2)
PLATELET # BLD AUTO: 241 K/UL (ref 150–450)
PMV BLD AUTO: 12 FL (ref 9.4–12.3)
POTASSIUM SERPL-SCNC: 4 MMOL/L (ref 3.5–5.1)
PROT SERPL-MCNC: 6.9 G/DL (ref 6.3–8.2)
RBC # BLD AUTO: 5 M/UL (ref 4.05–5.2)
SODIUM SERPL-SCNC: 140 MMOL/L (ref 136–145)
TRIGL SERPL-MCNC: 156 MG/DL (ref 35–150)
TSH, 3RD GENERATION: 1.45 UIU/ML (ref 0.36–3.74)
URATE SERPL-MCNC: 3.7 MG/DL (ref 2.6–6)
VIT B12 SERPL-MCNC: 326 PG/ML (ref 193–986)
VLDLC SERPL CALC-MCNC: 31.2 MG/DL (ref 6–23)
WBC # BLD AUTO: 7.5 K/UL (ref 4.3–11.1)

## 2022-10-05 PROCEDURE — 99214 OFFICE O/P EST MOD 30 MIN: CPT | Performed by: FAMILY MEDICINE

## 2022-10-05 RX ORDER — AZELASTINE 1 MG/ML
2 SPRAY, METERED NASAL 2 TIMES DAILY
Qty: 120 ML | Refills: 1 | Status: SHIPPED | OUTPATIENT
Start: 2022-10-05

## 2022-10-05 RX ORDER — ALPRAZOLAM 0.25 MG/1
TABLET ORAL
Qty: 60 TABLET | Refills: 0 | Status: SHIPPED | OUTPATIENT
Start: 2022-10-05 | End: 2023-10-18

## 2022-10-05 RX ORDER — PHENAZOPYRIDINE HYDROCHLORIDE 200 MG/1
TABLET, FILM COATED ORAL
COMMUNITY
Start: 2022-08-19 | End: 2022-10-05 | Stop reason: DRUGHIGH

## 2022-10-05 RX ORDER — ERGOCALCIFEROL (VITAMIN D2) 1250 MCG
50000 CAPSULE ORAL
Qty: 12 CAPSULE | Refills: 1 | Status: SHIPPED | OUTPATIENT
Start: 2022-10-05

## 2022-10-05 RX ORDER — NITROFURANTOIN 25; 75 MG/1; MG/1
CAPSULE ORAL
COMMUNITY
Start: 2022-08-19 | End: 2022-10-05 | Stop reason: DRUGHIGH

## 2022-10-05 ASSESSMENT — ENCOUNTER SYMPTOMS
NAUSEA: 0
BACK PAIN: 0
ABDOMINAL PAIN: 0
COUGH: 0
VISUAL CHANGE: 0
CHANGE IN BOWEL HABIT: 0
RHINORRHEA: 0
SWOLLEN GLANDS: 0
CONSTIPATION: 0
SORE THROAT: 0
WHEEZING: 0
SHORTNESS OF BREATH: 0
COLOR CHANGE: 0
DIARRHEA: 0
VOMITING: 1

## 2022-10-05 ASSESSMENT — PATIENT HEALTH QUESTIONNAIRE - PHQ9
SUM OF ALL RESPONSES TO PHQ9 QUESTIONS 1 & 2: 0
SUM OF ALL RESPONSES TO PHQ QUESTIONS 1-9: 0
SUM OF ALL RESPONSES TO PHQ QUESTIONS 1-9: 0
1. LITTLE INTEREST OR PLEASURE IN DOING THINGS: 0
SUM OF ALL RESPONSES TO PHQ QUESTIONS 1-9: 0
SUM OF ALL RESPONSES TO PHQ QUESTIONS 1-9: 0
2. FEELING DOWN, DEPRESSED OR HOPELESS: 0

## 2022-10-05 NOTE — PROGRESS NOTES
HISTORY OF PRESENT ILLNESS  Chief Complaint   Patient presents with    Hip Pain     Left hip shooting pains down her leg; pt states years ago she broke her femur years ago; approximately 1mth pain     Fatigue     Pt states she has noticed muscle weakness within the last couple of month        Left hip shooting pains down her leg; pt states years ago she broke her femur years ago; approximately 1mth pain   Increasing in frequency. Had a april in the femur . Since then has had a knee replacement and pain in the outer hip and shoots down the legs. Wakes her up at night now. When touches that area gets a sizzle. Tylenol helps. Can get a shooting pain down the leg. Tingly when she presses on the spot too. Pt states she has noticed muscle weakness within the last couple of months    Has had pain shooting down the lateral hips and sometimes dwon the front of the shin. No energy for awhile. Way too tired for her age. Has been through this before. Never sleeps well. Did not go to her sleep study. Then starts worrying that she has cancer  Speech has been a little affected. Takes a little while to figure out what she wants to say and then stumbles over her words. When eating lately it gets lodged in the lower esophagus. Can get really painful and sometimes has to throw it up. Happening much more often - 4 times last week. Hot and cold. Feels weak climbing the stairs. Right leg is certainly stronger    Wt Readings from Last 3 Encounters:   10/05/22 150 lb 3.2 oz (68.1 kg)   03/29/22 149 lb 4.8 oz (67.7 kg)   09/01/21 147 lb (66.7 kg)      BP Readings from Last 3 Encounters:   10/05/22 127/72   03/29/22 124/82   09/01/21 118/86          Hip Pain   The incident occurred more than 1 week ago. There was no injury mechanism. The pain is present in the left hip. The quality of the pain is described as aching. The pain is at a severity of 4/10. Associated symptoms include muscle weakness. Pertinent negatives include no inability to bear weight, loss of motion, loss of sensation, numbness or tingling. She has tried acetaminophen and rest for the symptoms. The treatment provided mild relief. Fatigue  This is a new problem. The current episode started 1 to 4 weeks ago. The problem occurs constantly. The problem has been gradually worsening. Associated symptoms include fatigue, vomiting and weakness. Pertinent negatives include no abdominal pain, anorexia, arthralgias, change in bowel habit, chest pain, chills, congestion, coughing, diaphoresis, fever, headaches, joint swelling, myalgias, nausea, neck pain, numbness, rash, sore throat, swollen glands, urinary symptoms, vertigo or visual change. Review of Systems   Constitutional:  Positive for fatigue. Negative for activity change, appetite change, chills, diaphoresis and fever. HENT:  Negative for congestion, rhinorrhea and sore throat. Respiratory:  Negative for cough, shortness of breath and wheezing. Cardiovascular:  Negative for chest pain. Gastrointestinal:  Positive for vomiting. Negative for abdominal pain, anorexia, change in bowel habit, constipation, diarrhea and nausea. Genitourinary:  Negative for dysuria. Musculoskeletal:  Negative for arthralgias, back pain, gait problem, joint swelling, myalgias and neck pain. Skin:  Negative for color change, rash and wound. Neurological:  Positive for weakness. Negative for dizziness, vertigo, tingling, tremors, numbness and headaches. Psychiatric/Behavioral:  Negative for dysphoric mood. The patient is not nervous/anxious.        Patient Active Problem List   Diagnosis    Elevated antinuclear antibody (KARAN) level    Hair loss    Eczema    Osteopenia    Low back pain    Snoring    OA (osteoarthritis) of knee    Menopausal symptoms    Difficulty sleeping    Vitamin D deficiency    Health care maintenance    Ulcerative colitis, chronic ([facility] [address]    IC (interstitial cystitis) deformity, tenderness or bony tenderness. No edema. Left lower leg: No swelling, deformity, tenderness or bony tenderness. No edema. Legs:       Comments: Tenderness buttocks   Skin:     General: Skin is warm and dry. Neurological:      Mental Status: She is alert. Sensory: Sensation is intact. Motor: Motor function is intact. No weakness, tremor, atrophy or abnormal muscle tone. Deep Tendon Reflexes:      Reflex Scores:       Patellar reflexes are 2+ on the right side and 2+ on the left side. Psychiatric:         Mood and Affect: Mood normal.         Behavior: Behavior normal.         Thought Content: Thought content normal.         Judgment: Judgment normal.            ASSESSMENT and PLAN   Diagnosis Orders   1. Esophageal dysphagia  CHELSI - Jeannette Betancur MD, Gastroenterology      2. Acute bilateral low back pain with bilateral sciatica  External Referral to Physical Therapy      3. Left hip pain  External Referral to Physical Therapy      4. Left buttock pain  External Referral to Physical Therapy      5. Arthralgia, unspecified joint  C-Reactive Protein    KARAN, Direct, w/Reflex    Rheumatoid Factor    Uric Acid      6. Elevated antinuclear antibody (KARAN) level        7. Vitamin D deficiency  ergocalciferol (ERGOCALCIFEROL) 1.25 MG (46642 UT) capsule    Vitamin D 25 Hydroxy      8. Difficulty sleeping        9. Menopausal symptoms        10. Weight gain  TSH    Comprehensive Metabolic Panel    CBC with Auto Differential      11. Screening for lipid disorders  Lipid Panel      12. Encounter for long-term (current) use of medications  Vitamin B12    Magnesium      13. ROSSANA (generalized anxiety disorder)  ALPRAZolam (XANAX) 0.25 MG tablet      14. Acute bacterial sinusitis  azelastine (ASTELIN) 0.1 % nasal spray          Reviewed medications and side effects in detail      The patient's condition was discussed at length with the patient.   The expected course and possible complications were reviewed. All questions were answered. Her other chronic conditions are stable at this time. She is stable on the current treatment and tolerating it well. No significant sides effects. Will not adjust therapy at this time, unless noted above. We will continue to monitor for any problems. Medications refilled and lab work has been ordered where needed. Reviewed medications are explained including any potential interactions or side effects in detail. The patient's questions were answered. She  understands the above and has no further questions. Further workup and treatment should be done if symptoms persist, worsen or new symptoms occur. She  will call to notify us of any problems, complications or worsening symptoms. Follow-up and Dispositions    Return for labs today. There are no Patient Instructions on file for this visit. (Some details in this note may have been created with speech-recognition software. Some errors in speech recognition may have occurred.    Most of those have been corrected but some may have been missed.)         Michele Winston MD  [address] [place] [address]  Phone [phone]

## 2022-10-06 LAB — RHEUMATOID FACT SER QL LA: NEGATIVE

## 2022-10-08 LAB — ANA SER QL: NEGATIVE

## 2022-10-18 NOTE — RESULT ENCOUNTER NOTE
Your vitamin D blood level is low normal.  I would suggest that you take a vitamin D3 2000 international units every day with a meal that contains fat. Vitamin D is a fat-soluble vitamin (that means it dissolves in fat) And must be taken with fat to be absorbed. Recent studies indicate that most people do best around a level of 50. Your triglycerides are slightly elevated. Triglycerides are the sugar part of your cholesterol. Make sure you are eating a heart healthy nutritious diet, getting regular aerobic physical activity, maintaining desired appropriate body weight, and avoiding tobacco products. Recheck lab work with an appointment in 6 months.   Other labs are normal.

## 2022-10-21 ENCOUNTER — CLINICAL DOCUMENTATION (OUTPATIENT)
Dept: FAMILY MEDICINE CLINIC | Facility: CLINIC | Age: 52
End: 2022-10-21

## 2022-11-28 ENCOUNTER — TELEPHONE (OUTPATIENT)
Dept: FAMILY MEDICINE CLINIC | Facility: CLINIC | Age: 52
End: 2022-11-28

## 2022-11-28 RX ORDER — VALACYCLOVIR HYDROCHLORIDE 1 G/1
TABLET, FILM COATED ORAL
Qty: 30 TABLET | Refills: 2 | Status: SHIPPED | OUTPATIENT
Start: 2022-11-28

## 2022-11-29 NOTE — TELEPHONE ENCOUNTER
No intraocular damage noted. Prescription(s) refilled  It (they) has been escribed to the pharmacy. Requested Prescriptions     Signed Prescriptions Disp Refills    valACYclovir (VALTREX) 1 g tablet 30 tablet 2     Si po q12h x1d for fever blister     Authorizing Provider: EVELIO VICKERS     No orders of the defined types were placed in this encounter. No diagnosis found.

## 2023-04-07 ENCOUNTER — OFFICE VISIT (OUTPATIENT)
Dept: FAMILY MEDICINE CLINIC | Facility: CLINIC | Age: 53
End: 2023-04-07

## 2023-04-07 VITALS
TEMPERATURE: 97.9 F | RESPIRATION RATE: 16 BRPM | WEIGHT: 156 LBS | OXYGEN SATURATION: 98 % | HEIGHT: 66 IN | DIASTOLIC BLOOD PRESSURE: 78 MMHG | BODY MASS INDEX: 25.07 KG/M2 | HEART RATE: 68 BPM | SYSTOLIC BLOOD PRESSURE: 128 MMHG

## 2023-04-07 DIAGNOSIS — G47.19 OTHER HYPERSOMNIA: ICD-10-CM

## 2023-04-07 DIAGNOSIS — J01.90 ACUTE BACTERIAL SINUSITIS: ICD-10-CM

## 2023-04-07 DIAGNOSIS — N63.42 SUBAREOLAR MASS OF LEFT BREAST: ICD-10-CM

## 2023-04-07 DIAGNOSIS — Z90.49 H/O TOTAL COLECTOMY: ICD-10-CM

## 2023-04-07 DIAGNOSIS — R53.82 CHRONIC FATIGUE, UNSPECIFIED: ICD-10-CM

## 2023-04-07 DIAGNOSIS — G47.9 DIFFICULTY SLEEPING: ICD-10-CM

## 2023-04-07 DIAGNOSIS — E55.9 VITAMIN D DEFICIENCY, UNSPECIFIED: ICD-10-CM

## 2023-04-07 DIAGNOSIS — J30.1 NON-SEASONAL ALLERGIC RHINITIS DUE TO POLLEN: ICD-10-CM

## 2023-04-07 DIAGNOSIS — Z12.11 SCREEN FOR COLON CANCER: ICD-10-CM

## 2023-04-07 DIAGNOSIS — Z00.00 PHYSICAL EXAM: ICD-10-CM

## 2023-04-07 DIAGNOSIS — N95.1 MENOPAUSAL SYMPTOMS: ICD-10-CM

## 2023-04-07 DIAGNOSIS — R13.19 ESOPHAGEAL DYSPHAGIA: ICD-10-CM

## 2023-04-07 DIAGNOSIS — E55.9 VITAMIN D DEFICIENCY: ICD-10-CM

## 2023-04-07 DIAGNOSIS — G47.10 DAYTIME HYPERSOMNIA: ICD-10-CM

## 2023-04-07 DIAGNOSIS — E78.5 DYSLIPIDEMIA: ICD-10-CM

## 2023-04-07 DIAGNOSIS — Z00.00 ENCOUNTER FOR WELL ADULT EXAM WITHOUT ABNORMAL FINDINGS: Primary | ICD-10-CM

## 2023-04-07 DIAGNOSIS — F41.1 GENERALIZED ANXIETY DISORDER: ICD-10-CM

## 2023-04-07 DIAGNOSIS — R63.5 WEIGHT GAIN: ICD-10-CM

## 2023-04-07 DIAGNOSIS — F41.1 GAD (GENERALIZED ANXIETY DISORDER): ICD-10-CM

## 2023-04-07 DIAGNOSIS — R06.83 SNORING: ICD-10-CM

## 2023-04-07 DIAGNOSIS — B00.1 FEVER BLISTER: ICD-10-CM

## 2023-04-07 DIAGNOSIS — B96.89 ACUTE BACTERIAL SINUSITIS: ICD-10-CM

## 2023-04-07 PROBLEM — R61 NIGHT SWEATS: Status: ACTIVE | Noted: 2019-07-15

## 2023-04-07 PROBLEM — R23.2 HOT FLASHES: Status: ACTIVE | Noted: 2019-07-15

## 2023-04-07 LAB
BILIRUBIN, URINE, POC: NEGATIVE
BLOOD URINE, POC: NEGATIVE
GLUCOSE URINE, POC: NEGATIVE
KETONES, URINE, POC: NEGATIVE
LEUKOCYTE ESTERASE, URINE, POC: NEGATIVE
NITRITE, URINE, POC: NEGATIVE
PH, URINE, POC: 5 (ref 4.6–8)
PROTEIN,URINE, POC: ABNORMAL
SPECIFIC GRAVITY, URINE, POC: 1 (ref 1–1.03)
URINALYSIS CLARITY, POC: CLEAR
URINALYSIS COLOR, POC: YELLOW
UROBILINOGEN, POC: ABNORMAL

## 2023-04-07 RX ORDER — AZELASTINE 1 MG/ML
2 SPRAY, METERED NASAL 2 TIMES DAILY
Qty: 120 ML | Refills: 5 | Status: SHIPPED | OUTPATIENT
Start: 2023-04-07

## 2023-04-07 RX ORDER — PROGESTERONE 100 MG/1
100 CAPSULE ORAL DAILY
COMMUNITY
Start: 2022-02-10 | End: 2023-04-07

## 2023-04-07 RX ORDER — ALPRAZOLAM 0.25 MG/1
TABLET ORAL
Qty: 60 TABLET | Refills: 1 | Status: SHIPPED | OUTPATIENT
Start: 2023-04-07 | End: 2024-04-18

## 2023-04-07 RX ORDER — VALACYCLOVIR HYDROCHLORIDE 1 G/1
TABLET, FILM COATED ORAL
Qty: 30 TABLET | Refills: 11 | Status: SHIPPED | OUTPATIENT
Start: 2023-04-07

## 2023-04-07 RX ORDER — ERGOCALCIFEROL 1.25 MG/1
50000 CAPSULE ORAL
Qty: 12 CAPSULE | Refills: 5 | Status: SHIPPED | OUTPATIENT
Start: 2023-04-07

## 2023-04-07 SDOH — ECONOMIC STABILITY: FOOD INSECURITY: WITHIN THE PAST 12 MONTHS, THE FOOD YOU BOUGHT JUST DIDN'T LAST AND YOU DIDN'T HAVE MONEY TO GET MORE.: NEVER TRUE

## 2023-04-07 SDOH — ECONOMIC STABILITY: FOOD INSECURITY: WITHIN THE PAST 12 MONTHS, YOU WORRIED THAT YOUR FOOD WOULD RUN OUT BEFORE YOU GOT MONEY TO BUY MORE.: NEVER TRUE

## 2023-04-07 SDOH — ECONOMIC STABILITY: INCOME INSECURITY: HOW HARD IS IT FOR YOU TO PAY FOR THE VERY BASICS LIKE FOOD, HOUSING, MEDICAL CARE, AND HEATING?: NOT HARD AT ALL

## 2023-04-07 SDOH — ECONOMIC STABILITY: HOUSING INSECURITY
IN THE LAST 12 MONTHS, WAS THERE A TIME WHEN YOU DID NOT HAVE A STEADY PLACE TO SLEEP OR SLEPT IN A SHELTER (INCLUDING NOW)?: NO

## 2023-04-07 ASSESSMENT — PATIENT HEALTH QUESTIONNAIRE - PHQ9
1. LITTLE INTEREST OR PLEASURE IN DOING THINGS: 0
2. FEELING DOWN, DEPRESSED OR HOPELESS: 0
SUM OF ALL RESPONSES TO PHQ QUESTIONS 1-9: 0
SUM OF ALL RESPONSES TO PHQ9 QUESTIONS 1 & 2: 0

## 2023-04-07 ASSESSMENT — VISUAL ACUITY: OU: 1

## 2023-04-07 NOTE — PROGRESS NOTES
least 30 minutes 3-4 times a week, screening colonoscopies, routine screening mammograms, and other routine screening including hemocult testing, pap smears, thyroid and diabetes screening, regular use of lap and shoulder seat belts, the proper use of sunscreen and protective clothing, regular vision screening. All questions were answered completely. The patient is asked to followup as directed. ASSESSMENT and PLAN   Diagnosis Orders   1. Encounter for well adult exam without abnormal findings        2. Vitamin D deficiency  ergocalciferol (ERGOCALCIFEROL) 1.25 MG (65479 UT) capsule    Vitamin D 25 Hydroxy      3. Acute bacterial sinusitis  azelastine (ASTELIN) 0.1 % nasal spray      4. ROSSANA (generalized anxiety disorder)  ALPRAZolam (XANAX) 0.25 MG tablet      5. Screen for colon cancer        6. Physical exam  AMB POC URINALYSIS DIP STICK MANUAL W/O MICRO      7. Menopausal symptoms        8. Difficulty sleeping        9. Non-seasonal allergic rhinitis due to pollen        10. Vitamin D deficiency, unspecified        11. Generalized anxiety disorder        12. Other hypersomnia  [facility]      13. Snoring  [facility]      14. Chronic fatigue, unspecified        15. Fever blister  valACYclovir (VALTREX) 1 g tablet      16. Weight gain  Comprehensive Metabolic Panel      17. Dyslipidemia  Lipid Panel      18. Daytime hypersomnia  [facility]      19. Esophageal dysphagia  External Referral To Gastroenterology      20. H/O total colectomy  External Referral To Gastroenterology      21. Subareolar mass of left breast  [facility] DIGITAL DIAGNOSTIC W OR WO CAD LEFT    US BREAST COMPLETE LEFT          Reviewed medications and side effects in detail    The patient's condition was discussed at length with the patient. The expected course and possible complications were reviewed. All questions were answered.        Her other chronic conditions are

## 2023-05-08 DIAGNOSIS — N63.42 SUBAREOLAR MASS OF LEFT BREAST: ICD-10-CM

## 2023-05-18 DIAGNOSIS — R92.8 ABNORMAL MAMMOGRAM OF LEFT BREAST: ICD-10-CM

## 2023-05-18 DIAGNOSIS — N64.89 BREAST ASYMMETRY: Primary | ICD-10-CM

## 2023-05-18 NOTE — PROGRESS NOTES
Requested Prescriptions      No prescriptions requested or ordered in this encounter     Orders Placed This Encounter   Procedures    [facility] DIGITAL DIAGNOSTIC UNILATERAL LEFT     Standing Status:   Future     Standing Expiration Date:   7/18/2024     Scheduling Instructions:      Marcin Muller     Order Specific Question:   Reason for exam:     Answer:   6-month follow-up focal asymmetry in the medial left breast posteriorly    US BREAST COMPLETE LEFT     Standing Status:   Future     Standing Expiration Date:   5/18/2024     Scheduling Instructions:      Marcin Muller     Order Specific Question:   Reason for exam:     Answer:   6-month follow-up focal asymmetry in the medial left breast posteriorly           ICD-10-CM    1. Breast asymmetry  N64.89 [facility] DIGITAL DIAGNOSTIC UNILATERAL LEFT     US BREAST COMPLETE LEFT      2.  Abnormal mammogram of left breast  R92.8 [facility] S[facility] DIGITAL DIAGNOSTIC UNILATERAL LEFT     US BREAST COMPLETE LEFT

## 2023-06-06 ENCOUNTER — HOSPITAL ENCOUNTER (OUTPATIENT)
Dept: SLEEP CENTER | Age: 53
Discharge: HOME | End: 2023-06-09

## 2023-06-22 ENCOUNTER — TELEPHONE (OUTPATIENT)
Dept: SLEEP MEDICINE | Age: 53
End: 2023-06-22

## 2023-06-22 NOTE — TELEPHONE ENCOUNTER
Patient needs New Pt PSG appt/ AHI -0.8       Lowest SaO2- 87% - [facility] 15           Forward to schedulers

## 2023-10-30 ENCOUNTER — TELEPHONE (OUTPATIENT)
Dept: FAMILY MEDICINE CLINIC | Facility: CLINIC | Age: 53
End: 2023-10-30

## 2023-10-30 DIAGNOSIS — R92.8 FOLLOW-UP EXAMINATION OF ABNORMAL MAMMOGRAM: Primary | ICD-10-CM

## 2023-10-30 NOTE — TELEPHONE ENCOUNTER
Orders Placed This Encounter   Procedures    [facility] DIGITAL DIAGNOSTIC W OR WO CAD BILATERAL     6 month follow up breast imaging     Standing Status:   Future     Standing Expiration Date:   12/30/2024     Scheduling Instructions:      [facility] BREAST COMPLETE LEFT     6 month breast follow up study     Standing Status:   Future     Standing Expiration Date:   10/30/2024     Scheduling Instructions:      Vessie Sledge Ulus Goldberg

## 2023-10-31 NOTE — TELEPHONE ENCOUNTER
Faxed order for bilateral diagnostic mammogram and left breast ultrasound to [facility] for patient to be scheduled @ their facility. Confirmation received.

## 2023-12-06 NOTE — TELEPHONE ENCOUNTER
----- Message from Sona Robertson sent at 10/30/2023 10:50 AM EDT -----  Subject: Message to Provider    QUESTIONS  Information for Provider? Siria from [facility] needs order for 6 month   bilateral diagnostic mammogram and left breast ultrasound. The diagnosis   code can be abnormal mammogram. Her appt is 11/6. The fax [phone]. Any questions [phone]  ---------------------------------------------------------------------------  --------------  Wilder Thurston INFO  [phone]; OK to leave message on voicemail  ---------------------------------------------------------------------------  --------------  SCRIPT ANSWERS  Relationship to Patient? Covered Entity  Covered Entity Type? Hospital?  Representative Name?  [facility] Quality 226: Preventive Care And Screening: Tobacco Use: Screening And Cessation Intervention: Patient screened for tobacco use and is an ex/non-smoker Detail Level: Detailed Quality 130: Documentation Of Current Medications In The Medical Record: Current Medications Documented Quality 431: Preventive Care And Screening: Unhealthy Alcohol Use - Screening: Patient not identified as an unhealthy alcohol user when screened for unhealthy alcohol use using a systematic screening method

## 2023-12-11 DIAGNOSIS — R92.8 FOLLOW-UP EXAMINATION OF ABNORMAL MAMMOGRAM: ICD-10-CM

## 2023-12-14 DIAGNOSIS — N63.21 MASS OF UPPER OUTER QUADRANT OF LEFT BREAST: ICD-10-CM

## 2023-12-14 DIAGNOSIS — R92.8 ABNORMAL MAMMOGRAM OF LEFT BREAST: ICD-10-CM

## 2023-12-14 DIAGNOSIS — N60.02 BREAST CYST, LEFT: Primary | ICD-10-CM

## 2023-12-14 NOTE — PROGRESS NOTES
Orders Placed This Encounter   Procedures    DAWN SAFIA DIGITAL DIAGNOSTIC UNILATERAL LEFT     Standing Status:   Future     Standing Expiration Date:   2/14/2025     Scheduling Instructions:      Parisa     Order Specific Question:   Reason for exam:     Answer:   Small cyst noted left breast at 8 o'clock position. Order Specific Question:   Reason for exam:     Answer:   10 M follow-up    US BREAST COMPLETE LEFT     6 M follow-up     Standing Status:   Future     Standing Expiration Date:   12/14/2024     Scheduling Instructions:      Parisa     Order Specific Question:   Reason for exam:     Answer:   Small cyst noted left breast at 8 o'clock position.

## 2023-12-14 NOTE — RESULT ENCOUNTER NOTE
Your mammogram is normal.  They did mention a small cyst noted in the left breast that looked benign.  They would like for you to do a diagnostic mammogram and ultrasound of that area in 6 months.  I will place that order. You can call and schedule your appointment for your mammogram.

## 2024-02-01 ENCOUNTER — TELEPHONE (OUTPATIENT)
Dept: FAMILY MEDICINE CLINIC | Facility: CLINIC | Age: 54
End: 2024-02-01

## 2024-02-01 DIAGNOSIS — E55.9 VITAMIN D DEFICIENCY: Primary | ICD-10-CM

## 2024-02-01 DIAGNOSIS — E78.5 DYSLIPIDEMIA: ICD-10-CM

## 2024-02-01 DIAGNOSIS — R63.5 WEIGHT GAIN: ICD-10-CM

## 2024-02-01 NOTE — TELEPHONE ENCOUNTER
Orders Placed This Encounter   Procedures    Lipid Panel     Standing Status:   Future     Standing Expiration Date:   2/1/2025    CBC with Auto Differential     Standing Status:   Future     Standing Expiration Date:   8/1/2025    TSH     Standing Status:   Future     Standing Expiration Date:   8/1/2025    Vitamin D 25 Hydroxy     Standing Status:   Future     Standing Expiration Date:   8/1/2025    Basic Metabolic Panel     Standing Status:   Future     Standing Expiration Date:   8/1/2025

## 2024-02-01 NOTE — TELEPHONE ENCOUNTER
----- Message from Karo Roland sent at 2/1/2024  3:04 PM EST -----  Subject: Message to Provider    QUESTIONS  Information for Provider? Patient would like to know if lab orders can   placed before her appt on 4/10. Please advise.   ---------------------------------------------------------------------------  --------------  CALL BACK INFO  [phone]; OK to leave message on voicemail  ---------------------------------------------------------------------------  --------------  SCRIPT ANSWERS  Relationship to Patient? Self

## 2024-02-05 NOTE — TELEPHONE ENCOUNTER
Orders Placed This Encounter   Procedures    Lipid Panel     Standing Status:   Future     Standing Expiration Date:   2/1/2025    CBC with Auto Differential     Standing Status:   Future     Standing Expiration Date:   8/1/2025    TSH     Standing Status:   Future     Standing Expiration Date:   8/1/2025    Vitamin D 25 Hydroxy     Standing Status:   Future     Standing Expiration Date:   8/1/2025    Basic Metabolic Panel     Standing Status:   Future     Standing Expiration Date:   8/1/2025    Salivary Cortisol     Standing Status:   Future     Standing Expiration Date:   2/2/2025

## 2024-04-09 ENCOUNTER — NURSE ONLY (OUTPATIENT)
Dept: FAMILY MEDICINE CLINIC | Facility: CLINIC | Age: 54
End: 2024-04-09

## 2024-04-09 DIAGNOSIS — E55.9 VITAMIN D DEFICIENCY: ICD-10-CM

## 2024-04-09 DIAGNOSIS — R63.5 WEIGHT GAIN: ICD-10-CM

## 2024-04-09 DIAGNOSIS — E78.5 DYSLIPIDEMIA: ICD-10-CM

## 2024-04-09 LAB
25(OH)D3 SERPL-MCNC: 82.9 NG/ML (ref 30–100)
ANION GAP SERPL CALC-SCNC: 2 MMOL/L (ref 2–11)
BASOPHILS # BLD: 0 K/UL (ref 0–0.2)
BASOPHILS NFR BLD: 1 % (ref 0–2)
BUN SERPL-MCNC: 10 MG/DL (ref 6–23)
CALCIUM SERPL-MCNC: 9.5 MG/DL (ref 8.3–10.4)
CHLORIDE SERPL-SCNC: 109 MMOL/L (ref 103–113)
CHOLEST SERPL-MCNC: 173 MG/DL
CO2 SERPL-SCNC: 28 MMOL/L (ref 21–32)
CREAT SERPL-MCNC: 0.8 MG/DL (ref 0.6–1)
DIFFERENTIAL METHOD BLD: NORMAL
EOSINOPHIL # BLD: 0.1 K/UL (ref 0–0.8)
EOSINOPHIL NFR BLD: 2 % (ref 0.5–7.8)
ERYTHROCYTE [DISTWIDTH] IN BLOOD BY AUTOMATED COUNT: 13.4 % (ref 11.9–14.6)
GLUCOSE SERPL-MCNC: 97 MG/DL (ref 65–100)
HCT VFR BLD AUTO: 44.7 % (ref 35.8–46.3)
HDLC SERPL-MCNC: 64 MG/DL (ref 40–60)
HDLC SERPL: 2.7
HGB BLD-MCNC: 14.6 G/DL (ref 11.7–15.4)
IMM GRANULOCYTES # BLD AUTO: 0 K/UL (ref 0–0.5)
IMM GRANULOCYTES NFR BLD AUTO: 0 % (ref 0–5)
LDLC SERPL CALC-MCNC: 95.4 MG/DL
LYMPHOCYTES # BLD: 1.3 K/UL (ref 0.5–4.6)
LYMPHOCYTES NFR BLD: 22 % (ref 13–44)
MCH RBC QN AUTO: 28.9 PG (ref 26.1–32.9)
MCHC RBC AUTO-ENTMCNC: 32.7 G/DL (ref 31.4–35)
MCV RBC AUTO: 88.3 FL (ref 82–102)
MONOCYTES # BLD: 0.6 K/UL (ref 0.1–1.3)
MONOCYTES NFR BLD: 10 % (ref 4–12)
NEUTS SEG # BLD: 3.7 K/UL (ref 1.7–8.2)
NEUTS SEG NFR BLD: 65 % (ref 43–78)
NRBC # BLD: 0 K/UL (ref 0–0.2)
PLATELET # BLD AUTO: 239 K/UL (ref 150–450)
PMV BLD AUTO: 12 FL (ref 9.4–12.3)
POTASSIUM SERPL-SCNC: 4.3 MMOL/L (ref 3.5–5.1)
RBC # BLD AUTO: 5.06 M/UL (ref 4.05–5.2)
SODIUM SERPL-SCNC: 139 MMOL/L (ref 136–146)
TRIGL SERPL-MCNC: 68 MG/DL (ref 35–150)
TSH, 3RD GENERATION: 1.28 UIU/ML (ref 0.36–3.74)
VLDLC SERPL CALC-MCNC: 13.6 MG/DL (ref 6–23)
WBC # BLD AUTO: 5.7 K/UL (ref 4.3–11.1)

## 2024-04-09 ASSESSMENT — ENCOUNTER SYMPTOMS
CHANGE IN BOWEL HABIT: 0
SWOLLEN GLANDS: 0
VISUAL CHANGE: 0

## 2024-04-10 ENCOUNTER — OFFICE VISIT (OUTPATIENT)
Dept: FAMILY MEDICINE CLINIC | Facility: CLINIC | Age: 54
End: 2024-04-10
Payer: OTHER GOVERNMENT

## 2024-04-10 VITALS
OXYGEN SATURATION: 97 % | DIASTOLIC BLOOD PRESSURE: 75 MMHG | BODY MASS INDEX: 24.75 KG/M2 | HEIGHT: 66 IN | SYSTOLIC BLOOD PRESSURE: 111 MMHG | TEMPERATURE: 98.2 F | WEIGHT: 154 LBS | RESPIRATION RATE: 16 BRPM | HEART RATE: 97 BPM

## 2024-04-10 DIAGNOSIS — R73.09 ABNORMAL GLUCOSE: ICD-10-CM

## 2024-04-10 DIAGNOSIS — R53.82 CHRONIC FATIGUE, UNSPECIFIED: ICD-10-CM

## 2024-04-10 DIAGNOSIS — G47.10 DAYTIME HYPERSOMNIA: ICD-10-CM

## 2024-04-10 DIAGNOSIS — E78.5 DYSLIPIDEMIA: ICD-10-CM

## 2024-04-10 DIAGNOSIS — R13.10 DYSPHAGIA, UNSPECIFIED TYPE: ICD-10-CM

## 2024-04-10 DIAGNOSIS — J30.1 NON-SEASONAL ALLERGIC RHINITIS DUE TO POLLEN: ICD-10-CM

## 2024-04-10 DIAGNOSIS — B00.1 FEVER BLISTER: ICD-10-CM

## 2024-04-10 DIAGNOSIS — G47.9 DIFFICULTY SLEEPING: ICD-10-CM

## 2024-04-10 DIAGNOSIS — Z12.31 ENCOUNTER FOR SCREENING MAMMOGRAM FOR MALIGNANT NEOPLASM OF BREAST: ICD-10-CM

## 2024-04-10 DIAGNOSIS — Z00.00 PHYSICAL EXAM: ICD-10-CM

## 2024-04-10 DIAGNOSIS — E55.9 VITAMIN D DEFICIENCY: ICD-10-CM

## 2024-04-10 DIAGNOSIS — R06.83 SNORING: ICD-10-CM

## 2024-04-10 DIAGNOSIS — Z00.00 ROUTINE GENERAL MEDICAL EXAMINATION AT A HEALTH CARE FACILITY: Primary | ICD-10-CM

## 2024-04-10 DIAGNOSIS — F41.1 GAD (GENERALIZED ANXIETY DISORDER): ICD-10-CM

## 2024-04-10 DIAGNOSIS — M67.442 GANGLION CYST OF FINGER OF LEFT HAND: ICD-10-CM

## 2024-04-10 DIAGNOSIS — H61.23 BILATERAL IMPACTED CERUMEN: ICD-10-CM

## 2024-04-10 LAB
BILIRUBIN, URINE, POC: NEGATIVE
BLOOD URINE, POC: NEGATIVE
GLUCOSE URINE, POC: NEGATIVE
KETONES, URINE, POC: NEGATIVE
LEUKOCYTE ESTERASE, URINE, POC: NEGATIVE
NITRITE, URINE, POC: NEGATIVE
PH, URINE, POC: 5 (ref 4.6–8)
PROTEIN,URINE, POC: NORMAL
SPECIFIC GRAVITY, URINE, POC: 1.02 (ref 1–1.03)
URINALYSIS CLARITY, POC: CLEAR
URINALYSIS COLOR, POC: YELLOW
UROBILINOGEN, POC: NORMAL

## 2024-04-10 PROCEDURE — 69210 REMOVE IMPACTED EAR WAX UNI: CPT | Performed by: FAMILY MEDICINE

## 2024-04-10 PROCEDURE — 81002 URINALYSIS NONAUTO W/O SCOPE: CPT | Performed by: FAMILY MEDICINE

## 2024-04-10 PROCEDURE — 99214 OFFICE O/P EST MOD 30 MIN: CPT | Performed by: FAMILY MEDICINE

## 2024-04-10 PROCEDURE — 99396 PREV VISIT EST AGE 40-64: CPT | Performed by: FAMILY MEDICINE

## 2024-04-10 RX ORDER — VALACYCLOVIR HYDROCHLORIDE 1 G/1
TABLET, FILM COATED ORAL
Qty: 90 TABLET | Refills: 11 | Status: SHIPPED | OUTPATIENT
Start: 2024-04-10

## 2024-04-10 RX ORDER — ERGOCALCIFEROL 1.25 MG/1
50000 CAPSULE ORAL
Qty: 12 CAPSULE | Refills: 5 | Status: SHIPPED | OUTPATIENT
Start: 2024-04-10

## 2024-04-10 RX ORDER — ALPRAZOLAM 0.25 MG/1
TABLET ORAL
Qty: 60 TABLET | Refills: 1 | Status: SHIPPED | OUTPATIENT
Start: 2024-04-10 | End: 2025-04-21

## 2024-04-10 SDOH — ECONOMIC STABILITY: INCOME INSECURITY: HOW HARD IS IT FOR YOU TO PAY FOR THE VERY BASICS LIKE FOOD, HOUSING, MEDICAL CARE, AND HEATING?: NOT HARD AT ALL

## 2024-04-10 SDOH — ECONOMIC STABILITY: FOOD INSECURITY: WITHIN THE PAST 12 MONTHS, YOU WORRIED THAT YOUR FOOD WOULD RUN OUT BEFORE YOU GOT MONEY TO BUY MORE.: NEVER TRUE

## 2024-04-10 SDOH — ECONOMIC STABILITY: FOOD INSECURITY: WITHIN THE PAST 12 MONTHS, THE FOOD YOU BOUGHT JUST DIDN'T LAST AND YOU DIDN'T HAVE MONEY TO GET MORE.: NEVER TRUE

## 2024-04-10 ASSESSMENT — PATIENT HEALTH QUESTIONNAIRE - PHQ9
SUM OF ALL RESPONSES TO PHQ QUESTIONS 1-9: 2
SUM OF ALL RESPONSES TO PHQ QUESTIONS 1-9: 2
2. FEELING DOWN, DEPRESSED OR HOPELESS: SEVERAL DAYS
SUM OF ALL RESPONSES TO PHQ9 QUESTIONS 1 & 2: 2
SUM OF ALL RESPONSES TO PHQ QUESTIONS 1-9: 2
2. FEELING DOWN, DEPRESSED OR HOPELESS: SEVERAL DAYS
1. LITTLE INTEREST OR PLEASURE IN DOING THINGS: SEVERAL DAYS
SUM OF ALL RESPONSES TO PHQ9 QUESTIONS 1 & 2: 2
1. LITTLE INTEREST OR PLEASURE IN DOING THINGS: SEVERAL DAYS
SUM OF ALL RESPONSES TO PHQ QUESTIONS 1-9: 2

## 2024-04-10 ASSESSMENT — VISUAL ACUITY: OU: 1

## 2024-04-10 NOTE — PROGRESS NOTES
Irritants to the Bladder     (Some details in this note may have been created with speech-recognition software.  Some errors in speech recognition may have occurred.   Most of those have been corrected but some may have been missed.)         Brenda Ruiz MD  [facility] [address] [address]  Phone [phone]

## 2024-04-11 ENCOUNTER — CLINICAL DOCUMENTATION (OUTPATIENT)
Dept: FAMILY MEDICINE CLINIC | Facility: CLINIC | Age: 54
End: 2024-04-11

## 2024-04-11 NOTE — PROGRESS NOTES
Faxed order for follow up mammogram to [facility] scheduling for patient to be scheduled @ [facility].This exam is not due until after 12/10/24. Confirmation received.

## 2024-04-14 LAB — CORTIS SAL-MCNC: 0.15 UG/DL

## 2024-05-14 ENCOUNTER — OFFICE VISIT (OUTPATIENT)
Age: 54
End: 2024-05-14
Payer: OTHER GOVERNMENT

## 2024-05-14 DIAGNOSIS — M67.449 MUCOUS CYST OF FINGER: Primary | ICD-10-CM

## 2024-05-14 PROCEDURE — 99204 OFFICE O/P NEW MOD 45 MIN: CPT | Performed by: ORTHOPAEDIC SURGERY

## 2024-05-14 NOTE — PROGRESS NOTES
Orthopaedic Hand Clinic Note    Name: Wendi Singh  YOB: 1970  Gender: female  MRN: [id number]      CC: Patient referred for evaluation of upper extremity pain    HPI: Wendi Singh is a 54 y.o. female with a chief complaint of painful bump on the left middle finger which has been present for about 2 months and seems to be increasing in size.       ROS/Meds/PSH/PMH/FH/SH: I personally reviewed the patients standard intake form.  Pertinents are discussed in the HPI    Physical Examination:    Musculoskeletal Exam:  Examination on the left upper extremity demonstrates cap refill < 5 seconds in all fingers, there is a 3x3mm soft round mass over the dorsal aspect of the middle finger DIP which is mildly tender to palpation. There is  no pain with DIP motion.    Imaging / Electrodiagnostic Tests:     Finger XR: AP, Lateral, Oblique views     Clinical Indication:  1. Mucous cyst of finger           Report: AP, lateral, oblique x-ray of the left middle finger demonstrates mild degenerative changes at the DIP    Impression: as above     Susana Smith MD         Assessment:     ICD-10-CM    1. Mucous cyst of finger  M67.449 XR FINGER LEFT (MIN 2 VIEWS)          Plan:   We discussed the diagnosis and different treatment options. We discussed observation, therapy, antiinflammatory medications and other pertinent treatment modalities.    After discussing in detail the patient elects to proceed with surgical treatment.    Patient understands risks and benefits of excision of left middle finger mass including but not limited to nerve injury, vessel injury, infection, failure to achieve desired results and possible need for additional surgery. Patient understands and wishes to proceed with surgery.     On Exam:   The patient is alert and oriented  Cardiovascular: regular rate and rhythm  Respiratory: Non labored breathing       Patient voiced accordance and understanding of the information provided and the

## 2024-05-14 NOTE — H&P (VIEW-ONLY)
Orthopaedic Hand Clinic Note    Name: Wendi Singh  YOB: 1970  Gender: female  MRN: [id number]      CC: Patient referred for evaluation of upper extremity pain    HPI: Wendi Singh is a 54 y.o. female with a chief complaint of painful bump on the left middle finger which has been present for about 2 months and seems to be increasing in size.       ROS/Meds/PSH/PMH/FH/SH: I personally reviewed the patients standard intake form.  Pertinents are discussed in the HPI    Physical Examination:    Musculoskeletal Exam:  Examination on the left upper extremity demonstrates cap refill < 5 seconds in all fingers, there is a 3x3mm soft round mass over the dorsal aspect of the middle finger DIP which is mildly tender to palpation. There is  no pain with DIP motion.    Imaging / Electrodiagnostic Tests:     Finger XR: AP, Lateral, Oblique views     Clinical Indication:  1. Mucous cyst of finger           Report: AP, lateral, oblique x-ray of the left middle finger demonstrates mild degenerative changes at the DIP    Impression: as above     Susana Smith MD         Assessment:     ICD-10-CM    1. Mucous cyst of finger  M67.449 XR FINGER LEFT (MIN 2 VIEWS)          Plan:   We discussed the diagnosis and different treatment options. We discussed observation, therapy, antiinflammatory medications and other pertinent treatment modalities.    After discussing in detail the patient elects to proceed with surgical treatment.    Patient understands risks and benefits of excision of left middle finger mass including but not limited to nerve injury, vessel injury, infection, failure to achieve desired results and possible need for additional surgery. Patient understands and wishes to proceed with surgery.     On Exam:   The patient is alert and oriented  Cardiovascular: regular rate and rhythm  Respiratory: Non labored breathing       Patient voiced accordance and understanding of the information provided and the

## 2024-05-15 DIAGNOSIS — M67.449 DIGITAL MUCOUS CYST: ICD-10-CM

## 2024-05-15 DIAGNOSIS — M67.449 MUCOUS CYST OF FINGER: Primary | ICD-10-CM

## 2024-05-24 ENCOUNTER — TELEPHONE (OUTPATIENT)
Age: 54
End: 2024-05-24

## 2024-05-24 NOTE — TELEPHONE ENCOUNTER
I left a VM for patient, advising her  that her surgery is scheduled for 6/12/2024 at the [facility] outpatient surgery center located at [address] [facility].  she will have a phone pre-assessment where they will call her a few days before her surgery to go over various health questions.  The OR will call her the business day before  her surgery (usually around 2:00 p.m.) to let her know what time she  needs to arrive the day of surgery.  I advised her that her post op appointment with Dr. Smith is scheduled on 6/24/2024 at 1:10 p.m.  at our [facility] office.  Advised her to call us back with any questions or concerns.

## 2024-06-03 ENCOUNTER — TELEPHONE (OUTPATIENT)
Dept: FAMILY MEDICINE CLINIC | Facility: CLINIC | Age: 54
End: 2024-06-03

## 2024-06-03 DIAGNOSIS — R92.8 ABNORMAL MAMMOGRAM: Primary | ICD-10-CM

## 2024-06-03 NOTE — TELEPHONE ENCOUNTER
[facility] called requesting an order for a diagnostic mammogram and ultrasound of the left breast for an abnormal mammogram. PT has appt for 06/10

## 2024-06-03 NOTE — TELEPHONE ENCOUNTER
Please use  diagnostic mammo ordered on 12/11/23.    Orders Placed This Encounter   Procedures    US BREAST COMPLETE LEFT     Standing Status:   Future     Standing Expiration Date:   6/3/2025     Scheduling Instructions:      [phone]

## 2024-06-06 ENCOUNTER — TELEPHONE (OUTPATIENT)
Dept: ORTHOPEDIC SURGERY | Age: 54
End: 2024-06-06

## 2024-06-06 RX ORDER — GINSENG 100 MG
50 CAPSULE ORAL DAILY
COMMUNITY

## 2024-06-06 NOTE — PROGRESS NOTES
Patient verified name and .  Order for consent NOT found in EHR; patient verifies procedure.   Type 1B surgery, Phone assessment complete.  Orders not received.  Labs per surgeon: none  Labs per anesthesia protocol: No additional    Patient answered medical/surgical history questions at their best of ability. All prior to admission medications documented in EPIC.    Patient instructed to continue taking all prescription medications up to the day of surgery but to take only the following medications the day of surgery according to anesthesia guidelines with a small sip of water:   Alprazolam (Xanax) if needed  Valacyclovir (Valtrex) if needed  Finasteride (Proscar)    Also, patient is requested to take 2 Tylenol in the morning and then again before bed on the day before surgery. Regular or extra strength may be used.       Patient informed that all vitamins and supplements should be held 7 days prior to surgery and NSAIDS 5 days prior to surgery. Prescription meds to hold: None    Patient instructed on the following:    > Arrive at [facility] OPC Entrance, time of arrival to be called the day before by 1700  > NPO after midnight, unless otherwise indicated, including gum, mints, and ice chips  > Responsible adult must drive patient to the hospital, stay during surgery, and patient will need supervision 24 hours after anesthesia  > Use non moisturizing soap in shower the night before surgery and on the morning of surgery  > All piercings must be removed prior to arrival.    > Leave all valuables (money and jewelry) at home but bring insurance card and ID on DOS.   > Do not wear make-up, nail polish, lotions, cologne, perfumes, powders, or oil on skin. Artificial nails are not permitted.

## 2024-06-07 NOTE — TELEPHONE ENCOUNTER
I called and spoke with patient.  She wanted to know what time she needs to be at the surgery center for her surgery on 6/12/2024.  I advised her that the surgery center will call her the business day before her surgery (usually around 2:00 p.m.) to let her know what time to be there the next day.  I also provided her with her post op appointment information.  Patient voiced understanding.

## 2024-06-11 ENCOUNTER — CLINICAL DOCUMENTATION (OUTPATIENT)
Dept: FAMILY MEDICINE CLINIC | Facility: CLINIC | Age: 54
End: 2024-06-11

## 2024-06-11 DIAGNOSIS — N60.02 BREAST CYST, LEFT: ICD-10-CM

## 2024-06-11 DIAGNOSIS — N63.21 MASS OF UPPER OUTER QUADRANT OF LEFT BREAST: ICD-10-CM

## 2024-06-11 DIAGNOSIS — R92.8 ABNORMAL MAMMOGRAM OF LEFT BREAST: ICD-10-CM

## 2024-06-11 DIAGNOSIS — R92.8 ABNORMAL MAMMOGRAM: ICD-10-CM

## 2024-06-11 NOTE — PROGRESS NOTES
Faxed order for 6 month follow up left breast mammogram and ultrasound to [facility] scheduling for patient to be scheduled @ [facility]. These tests are not due until December 2024. Confirmation received.

## 2024-06-11 NOTE — PERIOP NOTE
Preop department called to notify patient of arrival time for scheduled procedure. Instructions given to   - Arrive at OPC Entrance [address].  - Remain NPO after midnight, unless otherwise indicated, including gum, mints, and ice chips.   - Have a responsible adult to drive patient to the hospital, stay during surgery, and patient will need supervision 24 hours after anesthesia.   - Use antibacterial soap in shower the night before surgery and on the morning of surgery.       Was patient contacted: YES  Voicemail left:   Numbers contacted: [phone]   Arrival time: 0730

## 2024-06-12 ENCOUNTER — ANESTHESIA (OUTPATIENT)
Dept: SURGERY | Age: 54
End: 2024-06-12
Payer: OTHER GOVERNMENT

## 2024-06-12 ENCOUNTER — HOSPITAL ENCOUNTER (OUTPATIENT)
Age: 54
Setting detail: OUTPATIENT SURGERY
Discharge: HOME | End: 2024-06-12
Attending: ORTHOPAEDIC SURGERY | Admitting: ORTHOPAEDIC SURGERY
Payer: OTHER GOVERNMENT

## 2024-06-12 ENCOUNTER — ANESTHESIA EVENT (OUTPATIENT)
Dept: SURGERY | Age: 54
End: 2024-06-12
Payer: OTHER GOVERNMENT

## 2024-06-12 VITALS
SYSTOLIC BLOOD PRESSURE: 134 MMHG | HEART RATE: 90 BPM | DIASTOLIC BLOOD PRESSURE: 70 MMHG | BODY MASS INDEX: 22.29 KG/M2 | TEMPERATURE: 97.1 F | HEIGHT: 67 IN | OXYGEN SATURATION: 99 % | RESPIRATION RATE: 14 BRPM | WEIGHT: 142 LBS

## 2024-06-12 DIAGNOSIS — N60.02 BREAST CYST, LEFT: ICD-10-CM

## 2024-06-12 DIAGNOSIS — R22.32 FINGER MASS, LEFT: Primary | ICD-10-CM

## 2024-06-12 DIAGNOSIS — R92.8 ABNORMAL MAMMOGRAM: Primary | ICD-10-CM

## 2024-06-12 PROCEDURE — 3700000001 HC ADD 15 MINUTES (ANESTHESIA): Performed by: ORTHOPAEDIC SURGERY

## 2024-06-12 PROCEDURE — 88304 TISSUE EXAM BY PATHOLOGIST: CPT

## 2024-06-12 PROCEDURE — 3700000000 HC ANESTHESIA ATTENDED CARE: Performed by: ORTHOPAEDIC SURGERY

## 2024-06-12 PROCEDURE — 26111 EXC HAND LES SC 1.5 CM/>: CPT | Performed by: ORTHOPAEDIC SURGERY

## 2024-06-12 PROCEDURE — 6360000002 HC RX W HCPCS: Performed by: ORTHOPAEDIC SURGERY

## 2024-06-12 PROCEDURE — 6360000002 HC RX W HCPCS: Performed by: REGISTERED NURSE

## 2024-06-12 PROCEDURE — 7100000010 HC PHASE II RECOVERY - FIRST 15 MIN: Performed by: ORTHOPAEDIC SURGERY

## 2024-06-12 PROCEDURE — 7100000000 HC PACU RECOVERY - FIRST 15 MIN: Performed by: ORTHOPAEDIC SURGERY

## 2024-06-12 PROCEDURE — 2580000003 HC RX 258: Performed by: REGISTERED NURSE

## 2024-06-12 PROCEDURE — 3600000002 HC SURGERY LEVEL 2 BASE: Performed by: ORTHOPAEDIC SURGERY

## 2024-06-12 PROCEDURE — 2500000003 HC RX 250 WO HCPCS: Performed by: REGISTERED NURSE

## 2024-06-12 PROCEDURE — 7100000001 HC PACU RECOVERY - ADDTL 15 MIN: Performed by: ORTHOPAEDIC SURGERY

## 2024-06-12 PROCEDURE — 2709999900 HC NON-CHARGEABLE SUPPLY: Performed by: ORTHOPAEDIC SURGERY

## 2024-06-12 PROCEDURE — 3600000012 HC SURGERY LEVEL 2 ADDTL 15MIN: Performed by: ORTHOPAEDIC SURGERY

## 2024-06-12 PROCEDURE — 2500000003 HC RX 250 WO HCPCS: Performed by: ORTHOPAEDIC SURGERY

## 2024-06-12 RX ORDER — HYDROCODONE BITARTRATE AND ACETAMINOPHEN 5; 325 MG/1; MG/1
1 TABLET ORAL EVERY 6 HOURS PRN
Qty: 10 TABLET | Refills: 0 | Status: SHIPPED | OUTPATIENT
Start: 2024-06-12 | End: 2024-06-15

## 2024-06-12 RX ORDER — SODIUM CHLORIDE 0.9 % (FLUSH) 0.9 %
5-40 SYRINGE (ML) INJECTION PRN
Status: DISCONTINUED | OUTPATIENT
Start: 2024-06-12 | End: 2024-06-12 | Stop reason: HOSPADM

## 2024-06-12 RX ORDER — NALOXONE HYDROCHLORIDE 0.4 MG/ML
INJECTION, SOLUTION INTRAMUSCULAR; INTRAVENOUS; SUBCUTANEOUS PRN
Status: DISCONTINUED | OUTPATIENT
Start: 2024-06-12 | End: 2024-06-12 | Stop reason: HOSPADM

## 2024-06-12 RX ORDER — LIDOCAINE HYDROCHLORIDE 20 MG/ML
INJECTION, SOLUTION EPIDURAL; INFILTRATION; INTRACAUDAL; PERINEURAL PRN
Status: DISCONTINUED | OUTPATIENT
Start: 2024-06-12 | End: 2024-06-12 | Stop reason: SDUPTHER

## 2024-06-12 RX ORDER — MIDAZOLAM HYDROCHLORIDE 1 MG/ML
INJECTION INTRAMUSCULAR; INTRAVENOUS PRN
Status: DISCONTINUED | OUTPATIENT
Start: 2024-06-12 | End: 2024-06-12 | Stop reason: SDUPTHER

## 2024-06-12 RX ORDER — OXYCODONE HYDROCHLORIDE 5 MG/1
5 TABLET ORAL
Status: DISCONTINUED | OUTPATIENT
Start: 2024-06-12 | End: 2024-06-12 | Stop reason: HOSPADM

## 2024-06-12 RX ORDER — SODIUM CHLORIDE 9 MG/ML
INJECTION, SOLUTION INTRAVENOUS PRN
Status: DISCONTINUED | OUTPATIENT
Start: 2024-06-12 | End: 2024-06-12 | Stop reason: HOSPADM

## 2024-06-12 RX ORDER — SODIUM CHLORIDE, SODIUM LACTATE, POTASSIUM CHLORIDE, CALCIUM CHLORIDE 600; 310; 30; 20 MG/100ML; MG/100ML; MG/100ML; MG/100ML
INJECTION, SOLUTION INTRAVENOUS CONTINUOUS PRN
Status: DISCONTINUED | OUTPATIENT
Start: 2024-06-12 | End: 2024-06-12 | Stop reason: SDUPTHER

## 2024-06-12 RX ORDER — HYDROMORPHONE HYDROCHLORIDE 2 MG/ML
0.5 INJECTION, SOLUTION INTRAMUSCULAR; INTRAVENOUS; SUBCUTANEOUS EVERY 5 MIN PRN
Status: DISCONTINUED | OUTPATIENT
Start: 2024-06-12 | End: 2024-06-12 | Stop reason: HOSPADM

## 2024-06-12 RX ORDER — SODIUM CHLORIDE 0.9 % (FLUSH) 0.9 %
5-40 SYRINGE (ML) INJECTION EVERY 12 HOURS SCHEDULED
Status: DISCONTINUED | OUTPATIENT
Start: 2024-06-12 | End: 2024-06-12 | Stop reason: HOSPADM

## 2024-06-12 RX ORDER — LIDOCAINE HYDROCHLORIDE 10 MG/ML
1 INJECTION, SOLUTION INFILTRATION; PERINEURAL
Status: DISCONTINUED | OUTPATIENT
Start: 2024-06-12 | End: 2024-06-12 | Stop reason: HOSPADM

## 2024-06-12 RX ORDER — ONDANSETRON 2 MG/ML
4 INJECTION INTRAMUSCULAR; INTRAVENOUS
Status: DISCONTINUED | OUTPATIENT
Start: 2024-06-12 | End: 2024-06-12 | Stop reason: HOSPADM

## 2024-06-12 RX ORDER — LIDOCAINE HYDROCHLORIDE 10 MG/ML
INJECTION, SOLUTION INFILTRATION; PERINEURAL PRN
Status: DISCONTINUED | OUTPATIENT
Start: 2024-06-12 | End: 2024-06-12 | Stop reason: ALTCHOICE

## 2024-06-12 RX ORDER — PROPOFOL 10 MG/ML
INJECTION, EMULSION INTRAVENOUS CONTINUOUS PRN
Status: DISCONTINUED | OUTPATIENT
Start: 2024-06-12 | End: 2024-06-12 | Stop reason: SDUPTHER

## 2024-06-12 RX ORDER — SODIUM CHLORIDE, SODIUM LACTATE, POTASSIUM CHLORIDE, CALCIUM CHLORIDE 600; 310; 30; 20 MG/100ML; MG/100ML; MG/100ML; MG/100ML
INJECTION, SOLUTION INTRAVENOUS CONTINUOUS
Status: DISCONTINUED | OUTPATIENT
Start: 2024-06-12 | End: 2024-06-12 | Stop reason: HOSPADM

## 2024-06-12 RX ADMIN — PROPOFOL 200 MCG/KG/MIN: 10 INJECTION, EMULSION INTRAVENOUS at 09:30

## 2024-06-12 RX ADMIN — SODIUM CHLORIDE, SODIUM LACTATE, POTASSIUM CHLORIDE, AND CALCIUM CHLORIDE: 600; 310; 30; 20 INJECTION, SOLUTION INTRAVENOUS at 09:27

## 2024-06-12 RX ADMIN — LIDOCAINE HYDROCHLORIDE 40 MG: 20 INJECTION, SOLUTION EPIDURAL; INFILTRATION; INTRACAUDAL; PERINEURAL at 09:30

## 2024-06-12 RX ADMIN — Medication 2 G: at 09:32

## 2024-06-12 RX ADMIN — PROPOFOL 50 MG: 10 INJECTION, EMULSION INTRAVENOUS at 09:29

## 2024-06-12 RX ADMIN — PROPOFOL 30 MG: 10 INJECTION, EMULSION INTRAVENOUS at 09:36

## 2024-06-12 RX ADMIN — MIDAZOLAM 2 MG: 1 INJECTION INTRAMUSCULAR; INTRAVENOUS at 09:29

## 2024-06-12 ASSESSMENT — PAIN - FUNCTIONAL ASSESSMENT: PAIN_FUNCTIONAL_ASSESSMENT: 0-10

## 2024-06-12 ASSESSMENT — PAIN SCALES - GENERAL: PAINLEVEL_OUTOF10: 3

## 2024-06-12 ASSESSMENT — PAIN DESCRIPTION - DESCRIPTORS: DESCRIPTORS: ACHING

## 2024-06-12 NOTE — PROGRESS NOTES
Orders Placed This Encounter   Procedures    US BREAST COMPLETE LEFT     Standing Status:   Future     Standing Expiration Date:   6/12/2025     Scheduling Instructions:      [facility] Specific Question:   Reason for exam:     Answer:   6-month follow-up complicated cyst noted left breast on previous scan.

## 2024-06-12 NOTE — RESULT ENCOUNTER NOTE
The radiologist who read your mammogram would like some additional images in 6 months..    I will place those orders.

## 2024-06-12 NOTE — ANESTHESIA POSTPROCEDURE EVALUATION
Department of Anesthesiology  Postprocedure Note    Patient: Wendi Singh  MRN: [id number]  YOB: 1970  Date of evaluation: 6/12/2024    Procedure Summary       Date: 06/12/24 Room / Location: [facility] OP OR 05 / [facility] O[facility]    Anesthesia Start: 0922 Anesthesia Stop: 0959    Procedure: Left middle finger mass excision (Left: Middle Finger) Diagnosis:       Digital mucous cyst      (Digital mucous cyst [M67.449])    Surgeons: Susana Smith MD Responsible Provider: Kushal Jones Jr., MD    Anesthesia Type: TIVA ASA Status: 1            Anesthesia Type: No value filed.    Lisa Phase I: Lisa Score: 8    Lisa Phase II:      Anesthesia Post Evaluation    Patient location during evaluation: PACU  Patient participation: complete - patient participated  Level of consciousness: awake  Pain score: 0  Airway patency: patent  Nausea & Vomiting: no nausea and no vomiting  Cardiovascular status: blood pressure returned to baseline and hemodynamically stable  Respiratory status: acceptable, spontaneous ventilation and nonlabored ventilation  Hydration status: euvolemic  Multimodal analgesia pain management approach  Pain management: adequate    No notable events documented.

## 2024-06-12 NOTE — ANESTHESIA PRE PROCEDURE
ABGs: No results found for: \"PHART\", \"PO2ART\", \"HOT4FBX\", \"BSH6SHW\", \"BEART\", \"P9HENNNU\"     Type & Screen (If Applicable):  No results found for: \"LABABO\"    Drug/Infectious Status (If Applicable):  No results found for: \"HIV\", \"HEPCAB\"    COVID-19 Screening (If Applicable): No results found for: \"COVID19\"        Anesthesia Evaluation  Patient summary reviewed  Airway: Mallampati: II  TM distance: >3 FB   Neck ROM: full  Mouth opening: > = 3 FB   Dental:          Pulmonary:Negative Pulmonary ROS and normal exam                               Cardiovascular:Negative CV ROS  Exercise tolerance: good (>4 METS)                    Neuro/Psych:   (+) depression/anxiety             GI/Hepatic/Renal: Neg GI/Hepatic/Renal ROS            Endo/Other: Negative Endo/Other ROS                    Abdominal:             Vascular: negative vascular ROS.         Other Findings:       Anesthesia Plan      TIVA     ASA 1       Induction: intravenous.      Anesthetic plan and risks discussed with patient and spouse.                    SIERRA MODI JR, MD   6/12/2024

## 2024-06-12 NOTE — DISCHARGE INSTRUCTIONS
may cause drowsiness.  *  Please give a list of your current medications to your Primary Care Provider.  *  Please update this list whenever your medications are discontinued, doses are      changed, or new medications (including over-the-counter products) are added.  *  Please carry medication information at all times in case of emergency situations.    These are general instructions for a healthy lifestyle:  No smoking/ No tobacco products/ Avoid exposure to second hand smoke  Surgeon General's Warning:  Quitting smoking now greatly reduces serious risk to your health.  Obesity, smoking, and sedentary lifestyle greatly increases your risk for illness  A healthy diet, regular physical exercise & weight monitoring are important for maintaining a healthy lifestyle    You may be retaining fluid if you have a history of heart failure or if you experience any of the following symptoms:  Weight gain of 3 pounds or more overnight or 5 pounds in a week, increased swelling in our hands or feet or shortness of breath while lying flat in bed.  Please call your doctor as soon as you notice any of these symptoms; do not wait until your next office visit.

## 2024-06-13 NOTE — OP NOTE
ORTHOPAEDIC SURGICAL NOTE        Wendi Singh female 54 y.o.  [id number]   06/13/24    PRE-OP DIAGNOSIS: left middle finger mass  POST-OP DIAGNOSIS: Same  LATERALITY: Left            SURGEON:   Susana Smith MD     IMPLANTS:   * No implants in log *   Procedure(s):  Left middle finger mass excision   Surgeon(s):  Susana Smith MD   Procedure(s) with comments:  Left middle finger mass excision - with local     ANESTHESIA: Monitor Anesthesia Care     STAFF:    Circulator: Alba Butler RN     ESTIMATED BLOOD LOSS: Minimal       TOTAL IV FLUIDS : See anesthesia note    COMPLICATIONS: None     TOURNIQUET TIME:   Total Tourniquet Time Documented:  Arm  (Left) - 5 minutes  Total: Arm  (Left) - 5 minutes       INDICATION FOR PROCEDURE:     Wendi Singh has an uncomfortable mass on her left middle finger which has been present for several months.  Surgical and non-surgical treatment options were discussed with the patient and their family, as well as the risk and benefits of each option. After thorough discussion, the patient decided to proceed with surgical management.  Specific to this treatment plan, we discussed in detail surgical risks including scar, pain, bleeding, infection, anesthetic risks, neurovascular injury, failure to achieve desired results, hardware problems, need for further surgery,  weakness, stiffness, risk of death and potential risk of other unforseen complication.       The patient consented to the procedure after discussion of the risks and benefits.         DESCRIPTION OF PROCEDURE:     The patient was identified in the holding room. The left middle finger was marked and confirmed as the correct operative site. They were then brought to the OR and transferred onto the OR table in the supine position. All bony prominences were well padded. SCDs were placed on the bilateral legs throughout the case. A timeout was performed, verifying the correct patient, the correct side  and the correct  procedure. Antibiotics were then administered, and were redosed during the procedure as needed at indicated intervals.      A non-sterile tourniquet was placed on the arm.    The upper extremity was pre scrubbed and then prepped and draped in routine sterile fashion.      An incisional timeout was performed re-confirming the correct patient, surgical site and procedure, as well as verifying antibiotics.     Left upper extremity was exsanguinated and the tourniquet was inflated to 200 mmHg.  I made a T-shaped incision over the dorsal aspect of the middle finger distal interphalangeal joint directly overlying the mass.  Careful dissection was carried out through subcutaneous tissue.  The mass was identified and was whitish in appearance.  It was excised in its entirety, taking care to preserve the terminal tendon insertion.  It measured about 4 x 4 x 4 mm.  It was sent to pathology for identification    The tourniquet was deflated and hemostasis was ensured.  The wounds were then thoroughly irrigated and closed in layered fashion with 4-0 nylon     A sterile dressing was applied     The patient was awakened and taken to PACU in stable condition. All sponge and needle counts were correct at the end of the case. I was present and scrubbed for the entire procedure.      DISPOSITION:    The patient will be discharged home.     Weightbearing status: PWB       CHEMICAL VTE (DVT) PROPHYLAXIS: None warranted for this case     FOLLOW-UP:  10-14 days for wound check and XRs if needed.     Susana Smith MD    06/13/24  8:25 AM

## 2024-06-17 ENCOUNTER — CLINICAL DOCUMENTATION (OUTPATIENT)
Dept: FAMILY MEDICINE CLINIC | Facility: CLINIC | Age: 54
End: 2024-06-17

## 2024-06-17 NOTE — PROGRESS NOTES
Faxed order for 6 month follow up left breast ultrasound to [facility] scheduling for patient to be scheduled @ [facility]. This exam is not due until December 2024. Confirmation received.

## 2024-06-24 ENCOUNTER — OFFICE VISIT (OUTPATIENT)
Age: 54
End: 2024-06-24

## 2024-06-24 DIAGNOSIS — R22.32 FINGER MASS, LEFT: Primary | ICD-10-CM

## 2024-06-24 PROCEDURE — 99024 POSTOP FOLLOW-UP VISIT: CPT | Performed by: ORTHOPAEDIC SURGERY

## 2024-06-24 NOTE — PROGRESS NOTES
Orthopaedic Hand Surgery Note    Name: Wendi Singh  YOB: 1970  Gender: female  MRN: [id number]    Post Operative Visit: Left middle finger mass excision - Left    HPI: Patient is status post Left middle finger mass excision - Left on 6/12/2024. Patient reports well controlled pain.    Physical Examination:  Surgical incision is clean, dry and intact.  Sutures are in place.  There is no erythema or drainage. Sensation is intact in all fingers. Motor exam reveals no deficits. .    Imaging:     none    Assessment:   1. Finger mass, left         Status post Left middle finger mass excision - Left on 6/12/2024    Plan:  We discussed the post operative course and progression.  Sutures were moved and Steri-Strips were applied.  She can perform soap and water washes and dry dressing application as needed.  She is to avoid submerging the wound in water for an additional 1 to 2 weeks.  She can advance activities as tolerated and follow-up as needed        Susana Smith MD  06/24/24  1:20 PM

## 2024-07-03 ASSESSMENT — SLEEP AND FATIGUE QUESTIONNAIRES
HOW LIKELY ARE YOU TO NOD OFF OR FALL ASLEEP WHEN YOU ARE A PASSENGER IN A CAR FOR AN HOUR WITHOUT A BREAK: HIGH CHANCE OF DOZING
HOW LIKELY ARE YOU TO NOD OFF OR FALL ASLEEP WHILE SITTING INACTIVE IN A PUBLIC PLACE: SLIGHT CHANCE OF DOZING
HOW LIKELY ARE YOU TO NOD OFF OR FALL ASLEEP WHILE SITTING QUIETLY AFTER LUNCH WITHOUT ALCOHOL: MODERATE CHANCE OF DOZING
HOW LIKELY ARE YOU TO NOD OFF OR FALL ASLEEP WHILE LYING DOWN TO REST IN THE AFTERNOON WHEN CIRCUMSTANCES PERMIT: HIGH CHANCE OF DOZING
ESS TOTAL SCORE: 15
HOW LIKELY ARE YOU TO NOD OFF OR FALL ASLEEP WHILE SITTING AND TALKING TO SOMEONE: SLIGHT CHANCE OF DOZING
HOW LIKELY ARE YOU TO NOD OFF OR FALL ASLEEP WHILE WATCHING TV: MODERATE CHANCE OF DOZING
HOW LIKELY ARE YOU TO NOD OFF OR FALL ASLEEP WHILE SITTING AND READING: MODERATE CHANCE OF DOZING
HOW LIKELY ARE YOU TO NOD OFF OR FALL ASLEEP IN A CAR, WHILE STOPPED FOR A FEW MINUTES IN TRAFFIC: SLIGHT CHANCE OF DOZING

## 2024-07-03 NOTE — PROGRESS NOTES
[facility]  [address]  [place] [address]  [phone]    Patient Name:  Wendi Singh  YOB: 1970      Office Visit 7/5/2024    CHIEF COMPLAINT:    Chief Complaint   Patient presents with    New Patient       HISTORY OF PRESENT ILLNESS:      The patient presents in outpatient consultation at the request of Dr. Brenda Ruiz for evaluation and management of obstructive sleep apnea. PMH includes UC, low back pain, and chronic fatigue.     The diagnostic polysomnography was notable for an apnea hypopnea index of 0.8 including 1 obstructive apnea and 3 hypopneas. REM AHI 3.3.  Oxygen desaturations are low as 87% were noted with SpO2 less than 89% for a total of 1.6 minutes of the test.  Significant cardiac arrhythmias were not evident.  The patient was noted to have 63.4 limb movements with a limb movement arousal index being about 1.4.  Sleep efficiency 70%.    Patient has been told that she snores, states her  can sleep in the same room as her due to severe snoring.  Denies any witnessed apneas or awakening gasping.  Denies any vivid dreams or parasomnias.  Denies any RLS symptoms including aching/crawling or leg cramps.  She states she is able to fall asleep within 15 minutes however is awakening frequently due to nocturia.  She is typically going to bed sometime between 10-10:30 PM and awakening around 530-6:30 AM, sleeps in the supine and lateral position.  She is not rested in the morning, does have daytime fatigue and is napping daily although not refreshed afterwards.  Current [place] score 15/24.  Denies any morning headaches but does endorse dry mouth.    Sleep study results discussed with patient as well as pathophysiology of sleep apnea.  Given decreased sleep efficiency and overall symptoms we will proceed with drop ship HST. The pathophysiology of obstructive sleep apnea was reviewed with the patient.  It's potential to promote severe

## 2024-07-05 ENCOUNTER — OFFICE VISIT (OUTPATIENT)
Dept: SLEEP MEDICINE | Age: 54
End: 2024-07-05
Payer: OTHER GOVERNMENT

## 2024-07-05 VITALS
BODY MASS INDEX: 24.14 KG/M2 | OXYGEN SATURATION: 98 % | TEMPERATURE: 97.1 F | WEIGHT: 153.8 LBS | HEART RATE: 83 BPM | DIASTOLIC BLOOD PRESSURE: 74 MMHG | HEIGHT: 67 IN | RESPIRATION RATE: 17 BRPM | SYSTOLIC BLOOD PRESSURE: 122 MMHG

## 2024-07-05 DIAGNOSIS — G47.10 HYPERSOMNIA: ICD-10-CM

## 2024-07-05 DIAGNOSIS — G47.00 PERSISTENT DISORDER OF INITIATING OR MAINTAINING SLEEP: ICD-10-CM

## 2024-07-05 DIAGNOSIS — R29.818 SUSPECTED SLEEP APNEA: Primary | ICD-10-CM

## 2024-07-05 PROCEDURE — 99203 OFFICE O/P NEW LOW 30 MIN: CPT | Performed by: STUDENT IN AN ORGANIZED HEALTH CARE EDUCATION/TRAINING PROGRAM

## 2024-07-05 RX ORDER — ERGOCALCIFEROL 1.25 MG/1
50000 CAPSULE ORAL
COMMUNITY
Start: 2024-06-13

## 2024-07-05 NOTE — PATIENT INSTRUCTIONS
You will get a call from [employer] to schedule your at-home sleep study in the next 24-48 hours.  This device will be mailed to you. It is a 2 night sleep study and once completed, you will return device to address provided.      A physician will read the study and you will receive a call from our office with results or to schedule a follow-up appointment with the [facility] to discuss treatment options.      Patient Education        Sleep Apnea: Care Instructions  Overview     Sleep apnea means that you frequently stop breathing for 10 seconds or longer during sleep. It can be mild to severe, based on the number of times an hour that you stop breathing.  Blocked or narrowed airways in your nose, mouth, or throat can cause sleep apnea. Your airway can become blocked when your throat muscles and tongue relax during sleep.  You can help treat sleep apnea at home by making lifestyle changes. You also can use a CPAP breathing machine that keeps tissues in the throat from blocking your airway. You may use a mouth or nose device while you sleep to help keep your airway open. Surgery may be an option for some people. Surgery may be done to implant a nerve stimulation device in the chest that helps keep the airway open. Surgery can also be done to remove enlarged tissues that are blocking the throat.  Follow-up care is a key part of your treatment and safety. Be sure to make and go to all appointments, and call your doctor if you are having problems. It's also a good idea to know your test results and keep a list of the medicines you take.  How can you care for yourself at home?  Lose weight, if needed.  Sleep on your side. It may help mild apnea.  Avoid alcohol and medicines such as sleeping pills, opioids, or sedatives before bed.  Don't smoke. If you need help quitting, talk to your doctor.  Prop up the head of your bed.  Treat breathing problems, such as a stuffy nose, that are caused by a cold or allergies.  Try a

## 2024-07-23 ENCOUNTER — HOSPITAL ENCOUNTER (OUTPATIENT)
Dept: SLEEP CENTER | Age: 54
Discharge: HOME | End: 2024-07-26
Payer: OTHER GOVERNMENT

## 2024-07-23 PROCEDURE — 95806 SLEEP STUDY UNATT&RESP EFFT: CPT

## 2024-08-01 ENCOUNTER — TELEPHONE (OUTPATIENT)
Dept: SLEEP MEDICINE | Age: 54
End: 2024-08-01

## 2024-08-01 NOTE — TELEPHONE ENCOUNTER
Reviewed patient's home sleep study, she does have borderline mild obstructive sleep apnea with an AHI of 5 mild oxygen desaturations, I suspect she is positional as she spent the entire night on her back whereas on previous in lab study she had no significant sleep disordered breathing.  Clinical notes not clear about which therapy choice she would prefer and for this reason I recommend an in person or virtual visit to make treatment decisions.  I will send her a message on [employer] as well.

## 2024-09-29 NOTE — PROGRESS NOTES
mon)      _________________________________________________________________          Other Supplies:    (  X   )-Irtagvci (1 per 6 mon)  ( X    )-Wvuumq Tubing (1 per 3 mon)  (  X   )- Disposable Filter (2 per mon)  (   X  )-Nprmup Humidifier (1 per year)     (  x   )-Wkxfwguiq (sometimes used with Full Face Mask) (1 per 6 mos)  (     )-Tubing-without heat (1 per 3 mos)  ( X   )-Non-Disposable Filter (1 per 6 mos)  (   x  )-Water Chamber (1 per 6 mos)  (     )-Humidifier non-heated (1 per 5 yrs)      Signed Date: 9/30/2024  Electronically Signed By: PORTER Lyles     No orders of the defined types were placed in this encounter.        Collaborating Physician: Dr. Wen    Over 50% of today's office visit was spent in face to face time reviewing test results, prognosis, importance of compliance, education about disease process, benefits of medications, instructions for management of acute flare-ups, and follow up plans.  Total face to face time spent with patient was 23 minutes.        PORTER Lyles  Electronically signed

## 2024-09-30 ENCOUNTER — TELEMEDICINE (OUTPATIENT)
Dept: SLEEP MEDICINE | Age: 54
End: 2024-09-30
Payer: OTHER GOVERNMENT

## 2024-09-30 DIAGNOSIS — G47.33 OSA (OBSTRUCTIVE SLEEP APNEA): Primary | ICD-10-CM

## 2024-09-30 DIAGNOSIS — G47.34 NOCTURNAL HYPOXEMIA: ICD-10-CM

## 2024-09-30 PROCEDURE — 99213 OFFICE O/P EST LOW 20 MIN: CPT | Performed by: PHYSICIAN ASSISTANT

## 2024-09-30 NOTE — PATIENT INSTRUCTIONS
The company who will be taking care of your CPAP supplies is:    Address: [address], [place] [address]  Phone: [phone] Option #1  Fax: [phone]

## 2024-10-10 ENCOUNTER — LAB (OUTPATIENT)
Dept: FAMILY MEDICINE CLINIC | Facility: CLINIC | Age: 54
End: 2024-10-10

## 2024-10-10 DIAGNOSIS — R73.09 ABNORMAL GLUCOSE: ICD-10-CM

## 2024-10-10 LAB
ANION GAP SERPL CALC-SCNC: 9 MMOL/L (ref 9–18)
BUN SERPL-MCNC: 14 MG/DL (ref 6–23)
CALCIUM SERPL-MCNC: 9.4 MG/DL (ref 8.8–10.2)
CHLORIDE SERPL-SCNC: 104 MMOL/L (ref 98–107)
CO2 SERPL-SCNC: 26 MMOL/L (ref 20–28)
CREAT SERPL-MCNC: 0.84 MG/DL (ref 0.6–1.1)
EST. AVERAGE GLUCOSE BLD GHB EST-MCNC: 106 MG/DL
GLUCOSE SERPL-MCNC: 93 MG/DL (ref 70–99)
HBA1C MFR BLD: 5.3 % (ref 0–5.6)
POTASSIUM SERPL-SCNC: 4.5 MMOL/L (ref 3.5–5.1)
SODIUM SERPL-SCNC: 140 MMOL/L (ref 136–145)

## 2024-10-17 ENCOUNTER — OFFICE VISIT (OUTPATIENT)
Dept: FAMILY MEDICINE CLINIC | Facility: CLINIC | Age: 54
End: 2024-10-17
Payer: OTHER GOVERNMENT

## 2024-10-17 VITALS
OXYGEN SATURATION: 97 % | BODY MASS INDEX: 23.92 KG/M2 | WEIGHT: 152.4 LBS | RESPIRATION RATE: 17 BRPM | DIASTOLIC BLOOD PRESSURE: 83 MMHG | TEMPERATURE: 97.2 F | HEART RATE: 91 BPM | SYSTOLIC BLOOD PRESSURE: 110 MMHG | HEIGHT: 67 IN

## 2024-10-17 DIAGNOSIS — M54.42 CHRONIC LEFT-SIDED LOW BACK PAIN WITH LEFT-SIDED SCIATICA: Primary | ICD-10-CM

## 2024-10-17 DIAGNOSIS — F41.1 GAD (GENERALIZED ANXIETY DISORDER): ICD-10-CM

## 2024-10-17 DIAGNOSIS — G47.10 DAYTIME HYPERSOMNIA: ICD-10-CM

## 2024-10-17 DIAGNOSIS — G89.29 CHRONIC LEFT-SIDED LOW BACK PAIN WITH LEFT-SIDED SCIATICA: Primary | ICD-10-CM

## 2024-10-17 DIAGNOSIS — R53.82 CHRONIC FATIGUE, UNSPECIFIED: ICD-10-CM

## 2024-10-17 DIAGNOSIS — G47.33 OSA (OBSTRUCTIVE SLEEP APNEA): ICD-10-CM

## 2024-10-17 DIAGNOSIS — E55.9 VITAMIN D DEFICIENCY: ICD-10-CM

## 2024-10-17 DIAGNOSIS — E78.5 DYSLIPIDEMIA: ICD-10-CM

## 2024-10-17 PROCEDURE — 99214 OFFICE O/P EST MOD 30 MIN: CPT | Performed by: FAMILY MEDICINE

## 2024-10-17 RX ORDER — ALPRAZOLAM 0.25 MG/1
TABLET ORAL
Qty: 60 TABLET | Refills: 1 | Status: SHIPPED | OUTPATIENT
Start: 2024-10-17 | End: 2025-10-28

## 2024-10-17 RX ORDER — OXICONAZOLE NITRATE 10 MG/G
CREAM TOPICAL
COMMUNITY
Start: 2024-08-19

## 2024-10-17 RX ORDER — ESTRADIOL 0.1 MG/G
CREAM VAGINAL
COMMUNITY

## 2024-10-17 ASSESSMENT — PATIENT HEALTH QUESTIONNAIRE - PHQ9
SUM OF ALL RESPONSES TO PHQ QUESTIONS 1-9: 2
2. FEELING DOWN, DEPRESSED OR HOPELESS: SEVERAL DAYS
SUM OF ALL RESPONSES TO PHQ QUESTIONS 1-9: 2
1. LITTLE INTEREST OR PLEASURE IN DOING THINGS: SEVERAL DAYS
SUM OF ALL RESPONSES TO PHQ9 QUESTIONS 1 & 2: 2
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
## 2024-11-13 ENCOUNTER — PATIENT MESSAGE (OUTPATIENT)
Dept: FAMILY MEDICINE CLINIC | Facility: CLINIC | Age: 54
End: 2024-11-13

## 2024-11-13 DIAGNOSIS — G47.33 OSA (OBSTRUCTIVE SLEEP APNEA): Primary | ICD-10-CM

## 2024-11-13 NOTE — TELEPHONE ENCOUNTER
Orders Placed This Encounter   Procedures    External Referral To Dentistry     Referral Priority:   Routine     Referral Type:   Eval and Treat     Referral Reason:   Specialty Services Required     Requested Specialty:   Dentistry     Number of Visits Requested:   1

## 2024-12-05 ENCOUNTER — TELEPHONE (OUTPATIENT)
Dept: FAMILY MEDICINE CLINIC | Facility: CLINIC | Age: 54
End: 2024-12-05

## 2024-12-05 NOTE — TELEPHONE ENCOUNTER
Physical Therapy Plan of Care received by fax from [facility]. Original Evaluation date of 12/04/24. Placed in Dr Ruiz's tray.

## 2025-04-17 ENCOUNTER — OFFICE VISIT (OUTPATIENT)
Dept: FAMILY MEDICINE CLINIC | Facility: CLINIC | Age: 55
End: 2025-04-17

## 2025-04-17 VITALS
OXYGEN SATURATION: 97 % | SYSTOLIC BLOOD PRESSURE: 125 MMHG | HEIGHT: 67 IN | RESPIRATION RATE: 17 BRPM | TEMPERATURE: 97.5 F | HEART RATE: 107 BPM | DIASTOLIC BLOOD PRESSURE: 84 MMHG | WEIGHT: 159.6 LBS | BODY MASS INDEX: 25.05 KG/M2

## 2025-04-17 DIAGNOSIS — R63.5 WEIGHT GAIN: ICD-10-CM

## 2025-04-17 DIAGNOSIS — H61.21 IMPACTED CERUMEN OF RIGHT EAR: Primary | ICD-10-CM

## 2025-04-17 DIAGNOSIS — B00.1 FEVER BLISTER: ICD-10-CM

## 2025-04-17 DIAGNOSIS — F41.1 GAD (GENERALIZED ANXIETY DISORDER): ICD-10-CM

## 2025-04-17 DIAGNOSIS — M54.42 CHRONIC LEFT-SIDED LOW BACK PAIN WITH LEFT-SIDED SCIATICA: ICD-10-CM

## 2025-04-17 DIAGNOSIS — N95.1 MENOPAUSAL SYMPTOMS: ICD-10-CM

## 2025-04-17 DIAGNOSIS — R53.82 CHRONIC FATIGUE: ICD-10-CM

## 2025-04-17 DIAGNOSIS — F41.1 GENERALIZED ANXIETY DISORDER: ICD-10-CM

## 2025-04-17 DIAGNOSIS — G47.10 DAYTIME HYPERSOMNIA: ICD-10-CM

## 2025-04-17 DIAGNOSIS — R61 NIGHT SWEATS: ICD-10-CM

## 2025-04-17 DIAGNOSIS — E55.9 VITAMIN D DEFICIENCY: ICD-10-CM

## 2025-04-17 DIAGNOSIS — Z96.652 STATUS POST TOTAL LEFT KNEE REPLACEMENT: ICD-10-CM

## 2025-04-17 DIAGNOSIS — G89.29 CHRONIC LEFT-SIDED LOW BACK PAIN WITH LEFT-SIDED SCIATICA: ICD-10-CM

## 2025-04-17 RX ORDER — VALACYCLOVIR HYDROCHLORIDE 1 G/1
TABLET, FILM COATED ORAL
Qty: 90 TABLET | Refills: 11 | Status: SHIPPED | OUTPATIENT
Start: 2025-04-17

## 2025-04-17 RX ORDER — ALPRAZOLAM 0.25 MG
TABLET ORAL
Qty: 60 TABLET | Refills: 1 | Status: SHIPPED | OUTPATIENT
Start: 2025-04-17 | End: 2026-04-28

## 2025-04-17 SDOH — ECONOMIC STABILITY: FOOD INSECURITY: WITHIN THE PAST 12 MONTHS, YOU WORRIED THAT YOUR FOOD WOULD RUN OUT BEFORE YOU GOT MONEY TO BUY MORE.: NEVER TRUE

## 2025-04-17 SDOH — ECONOMIC STABILITY: FOOD INSECURITY: WITHIN THE PAST 12 MONTHS, THE FOOD YOU BOUGHT JUST DIDN'T LAST AND YOU DIDN'T HAVE MONEY TO GET MORE.: NEVER TRUE

## 2025-04-17 ASSESSMENT — PATIENT HEALTH QUESTIONNAIRE - PHQ9
1. LITTLE INTEREST OR PLEASURE IN DOING THINGS: NOT AT ALL
2. FEELING DOWN, DEPRESSED OR HOPELESS: SEVERAL DAYS
SUM OF ALL RESPONSES TO PHQ QUESTIONS 1-9: 1

## 2025-04-17 NOTE — PROGRESS NOTES
any potential interactions or side effects in detail. The patient's questions were answered.  She  understands the above and has no further questions.    Further workup and treatment should be done if symptoms persist, worsen or new symptoms occur. She  will call to notify us of any problems, complications or worsening symptoms.        The following additional handouts were provided to patient:    none    The patient (or guardian, if applicable) and other individuals in attendance with the patient were advised that Artificial Intelligence will be utilized during this visit to record, process the conversation to generate a clinical note, and support improvement of the AI technology. The patient (or guardian, if applicable) and other individuals in attendance at the appointment consented to the use of AI, including the recording.      (Some details in this note may have been created with speech-recognition software.  Some errors in speech recognition may have occurred.   Most of those have been corrected but some may have been missed.)         Brenda Ruiz MD  [facility] [address] [address]  Phone [phone]

## 2025-05-27 ENCOUNTER — PATIENT MESSAGE (OUTPATIENT)
Dept: FAMILY MEDICINE CLINIC | Facility: CLINIC | Age: 55
End: 2025-05-27

## 2025-06-18 ENCOUNTER — TELEPHONE (OUTPATIENT)
Dept: FAMILY MEDICINE CLINIC | Facility: CLINIC | Age: 55
End: 2025-06-18

## 2025-06-18 DIAGNOSIS — N60.02 CYST OF LEFT BREAST: Primary | ICD-10-CM

## 2025-06-18 NOTE — TELEPHONE ENCOUNTER
Maldonado with radiology at [facility]:    Pt is coming in for a 6 month follow up bilateral diagnostic mammogram and left breast ultrasound. Orders are requested.     Callback: [phone]  Fax: [phone]

## 2025-06-18 NOTE — TELEPHONE ENCOUNTER
Orders Placed This Encounter   Procedures    [facility] DIGITAL DIAGNOSTIC BILATERAL     Standing Status:   Future     Expected Date:   6/18/2025     Expiration Date:   8/18/2026     Scheduling Instructions:      Antoinette -6-month follow-up     Reason for exam::   6-month follow-up    US BREAST COMPLETE LEFT     Standing Status:   Future     Expected Date:   6/18/2025     Expiration Date:   6/18/2026     Scheduling Instructions:      Antoinette -6-month follow-up     Reason for exam::   6-month follow-up

## 2025-06-20 NOTE — TELEPHONE ENCOUNTER
Anthony with [facility] called back asking if paperwork could be faxed before Wednesday of next week, this is when patient is expected to come in. Please advise.     Fax: [phone]  Call: [phone]

## 2025-08-15 ENCOUNTER — PATIENT MESSAGE (OUTPATIENT)
Dept: FAMILY MEDICINE CLINIC | Facility: CLINIC | Age: 55
End: 2025-08-15

## (undated) DEVICE — SUTURE VCRL SZ 1 L27IN ABSRB UD L36MM CP-1 1/2 CIR REV CUT J268H

## (undated) DEVICE — SUTURE NONABSORBABLE MONOFILAMENT 4-0 PS-2 18 IN BLK ETHILON 1667G

## (undated) DEVICE — HAND PACK: Brand: MEDLINE INDUSTRIES, INC.

## (undated) DEVICE — 2000CC GUARDIAN II: Brand: GUARDIAN

## (undated) DEVICE — MASTISOL ADHESIVE LIQ 2/3ML

## (undated) DEVICE — SUTURE ABSRB X-1 REV CUT 1/2 CIR 22MM UD BRAID 27IN SZ 3-0 J458H

## (undated) DEVICE — DISPOSABLE BIPOLAR CODE, 12' (3.66 M): Brand: CONMED

## (undated) DEVICE — SYR 10ML LUER LOK 1/5ML GRAD --

## (undated) DEVICE — SUT ETHBND 2 30IN LR DA GRN --

## (undated) DEVICE — SYRINGE IRRIG 60ML SFT PLIABLE BLB EZ TO GRP 1 HND USE W/

## (undated) DEVICE — SOLUTION IV DEXTROSE/SALINE 5%-0.9% 500ML - 500ML

## (undated) DEVICE — SOLUTION IV 1000ML 0.9% SOD CHL

## (undated) DEVICE — SUTURE STRATAFIX SYMMETRIC PDS + SZ 2-0 L18IN ABSRB VLT SXPP1A403

## (undated) DEVICE — GLOVE SURG SZ 65 THK91MIL LTX FREE SYN POLYISOPRENE

## (undated) DEVICE — 3000CC GUARDIAN II: Brand: GUARDIAN

## (undated) DEVICE — SOLUTION IRRIG 1000ML 0.9% SOD CHL USP POUR PLAS BTL

## (undated) DEVICE — REM POLYHESIVE ADULT PATIENT RETURN ELECTRODE: Brand: VALLEYLAB

## (undated) DEVICE — SYRINGE CATH TIP 50ML

## (undated) DEVICE — SUTURE PDS II SZ 1 L96IN ABSRB VLT TP-1 L65MM 1/2 CIR Z880G

## (undated) DEVICE — DRAPE,TOP,102X53,STERILE: Brand: MEDLINE

## (undated) DEVICE — SYR LR LCK 1ML GRAD NSAF 30ML --

## (undated) DEVICE — STRIP,CLOSURE,WOUND,MEDI-STRIP,1/2X4: Brand: MEDLINE

## (undated) DEVICE — PHOTON BLADE

## (undated) DEVICE — (D)PREP SKN CHLRAPRP APPL 26ML -- CONVERT TO ITEM 371833

## (undated) DEVICE — GLOVE SURG SZ 65 L12IN FNGR THK79MIL GRN LTX FREE

## (undated) DEVICE — X-LARGE COTTON GLOVE: Brand: DEROYAL

## (undated) DEVICE — BNDG,ELSTC,MATRIX,STRL,2"X5YD,LF,HOOK&LP: Brand: MEDLINE

## (undated) DEVICE — PADDING CAST W3INXL4YD COT BLEND MIC PLEAT UNDERCAST SPEC

## (undated) DEVICE — KIT INSTR 3 FEM POST STBL DISPOSABLE

## (undated) DEVICE — DRAPE,U/SHT,SPLIT,FILM,60X84,STERILE: Brand: MEDLINE

## (undated) DEVICE — CURETTE BNE CEM 10IN DISP --

## (undated) DEVICE — PRECISION FALCON OSCILLATING TIP SAW CARTRIDGE: Brand: PRECISION FALCON

## (undated) DEVICE — FAN SPRAY KIT: Brand: PULSAVAC®

## (undated) DEVICE — BANDAGE COMPR SELF ADH 5 YDX4 IN TAN STRL PREMIERPRO LF

## (undated) DEVICE — Device

## (undated) DEVICE — BANDAGE COMPR W1INXL5YD FOAM COHESIVE QUIK STK SELF ADH SFT

## (undated) DEVICE — BUTTON SWITCH PENCIL BLADE ELECTRODE, HOLSTER: Brand: EDGE

## (undated) DEVICE — T4 HOOD

## (undated) DEVICE — TRAY CATH 16F DRN BG LTX -- CONVERT TO ITEM 363158

## (undated) DEVICE — 3M™ STERI-DRAPE™ INSTRUMENT POUCH 1018: Brand: STERI-DRAPE™

## (undated) DEVICE — SUTURE MONOCRYL SZ 3-0 L27IN ABSRB UD L19MM PS-2 3/8 CIR PRIM Y427H

## (undated) DEVICE — INSTRUMENT ORTH SZ 3 KT FOR POST STBL TIB PREP TRIATHLON

## (undated) DEVICE — ZIMMER® STERILE DISPOSABLE TOURNIQUET CUFF WITH PLC, DUAL PORT, SINGLE BLADDER, 18 IN. (46 CM)

## (undated) DEVICE — TRAY PREP DRY W/ PREM GLV 2 APPL 6 SPNG 2 UNDPD 1 OVERWRAP

## (undated) DEVICE — PACK PROCEDURE SURG TOT KNEE

## (undated) DEVICE — SYR 50ML LR LCK 1ML GRAD NSAF --

## (undated) DEVICE — Z DISCONTINUED USE 2744636  DRESSING AQUACEL 14 IN ALG W3.5XL14IN POLYUR FLM CVR W/ HYDRCOLL

## (undated) DEVICE — MEDI-VAC YANKAUER SUCTION HANDLE W/BULBOUS TIP: Brand: CARDINAL HEALTH

## (undated) DEVICE — SOLUTION IRRIG 3000ML 0.9% SOD CHL FLX CONT 0797208] ICU MEDICAL INC]

## (undated) DEVICE — BLADE SAW PAT RMR PILT H 38MM --

## (undated) DEVICE — SKIN STAPLER: Brand: SIGNET